# Patient Record
Sex: FEMALE | Race: WHITE | Employment: OTHER | URBAN - METROPOLITAN AREA
[De-identification: names, ages, dates, MRNs, and addresses within clinical notes are randomized per-mention and may not be internally consistent; named-entity substitution may affect disease eponyms.]

---

## 2017-07-24 RX ORDER — ESTERIFIED ESTROGEN AND METHYLTESTOSTERONE .625; 1.25 MG/1; MG/1
1 TABLET ORAL DAILY
Qty: 30 TABLET | Refills: 5 | Status: SHIPPED | OUTPATIENT
Start: 2017-07-24 | End: 2017-08-29

## 2017-08-07 ENCOUNTER — HOSPITAL ENCOUNTER (OUTPATIENT)
Dept: WOMENS IMAGING | Age: 62
Discharge: HOME | End: 2017-08-07
Attending: INTERNAL MEDICINE | Admitting: INTERNAL MEDICINE

## 2017-08-07 DIAGNOSIS — Z12.31 VISIT FOR SCREENING MAMMOGRAM: ICD-10-CM

## 2017-08-29 ENCOUNTER — OFFICE VISIT (OUTPATIENT)
Dept: GYNECOLOGY | Age: 62
End: 2017-08-29

## 2017-08-29 VITALS
DIASTOLIC BLOOD PRESSURE: 69 MMHG | RESPIRATION RATE: 17 BRPM | SYSTOLIC BLOOD PRESSURE: 128 MMHG | BODY MASS INDEX: 29.45 KG/M2 | WEIGHT: 172.5 LBS | TEMPERATURE: 97.8 F | HEIGHT: 64 IN | HEART RATE: 82 BPM

## 2017-08-29 DIAGNOSIS — Z78.0 MENOPAUSE: ICD-10-CM

## 2017-08-29 DIAGNOSIS — Z01.419 WELL WOMAN EXAM WITH ROUTINE GYNECOLOGICAL EXAM: Primary | ICD-10-CM

## 2017-08-29 PROCEDURE — 99396 PREV VISIT EST AGE 40-64: CPT | Performed by: OBSTETRICS & GYNECOLOGY

## 2017-08-29 RX ORDER — ESTERIFIED ESTROGEN AND METHYLTESTOSTERONE .625; 1.25 MG/1; MG/1
1 TABLET ORAL DAILY
Qty: 30 TABLET | Refills: 5 | Status: ON HOLD | OUTPATIENT
Start: 2017-08-29 | End: 2019-03-11 | Stop reason: ALTCHOICE

## 2017-08-29 RX ORDER — ESTRADIOL 1 MG/1
1 TABLET ORAL DAILY
Qty: 90 TABLET | Refills: 3 | Status: SHIPPED | OUTPATIENT
Start: 2017-08-29 | End: 2018-08-30 | Stop reason: SDUPTHER

## 2017-08-29 ASSESSMENT — ENCOUNTER SYMPTOMS
RESPIRATORY NEGATIVE: 1
GASTROINTESTINAL NEGATIVE: 1
EYES NEGATIVE: 1

## 2017-08-31 LAB
HPV COMMENT: NORMAL
HPV TYPE 16: NOT DETECTED
HPV TYPE 18: NOT DETECTED
HPVOH (OTHER TYPES): NOT DETECTED

## 2018-08-27 ENCOUNTER — HOSPITAL ENCOUNTER (OUTPATIENT)
Dept: WOMENS IMAGING | Age: 63
Discharge: HOME | End: 2018-08-27
Attending: OBSTETRICS & GYNECOLOGY | Admitting: OBSTETRICS & GYNECOLOGY

## 2018-08-27 DIAGNOSIS — Z12.31 VISIT FOR SCREENING MAMMOGRAM: ICD-10-CM

## 2018-08-29 ENCOUNTER — HOSPITAL ENCOUNTER (OUTPATIENT)
Dept: WOMENS IMAGING | Age: 63
Discharge: HOME | End: 2018-08-29
Admitting: OBSTETRICS & GYNECOLOGY

## 2018-08-29 DIAGNOSIS — R92.8 OTHER ABNORMAL AND INCONCLUSIVE FINDINGS ON DIAGNOSTIC IMAGING OF BREAST: ICD-10-CM

## 2018-08-29 DIAGNOSIS — R92.8 ABNORMAL MAMMOGRAM: ICD-10-CM

## 2018-08-30 DIAGNOSIS — Z78.0 MENOPAUSE: ICD-10-CM

## 2018-08-30 RX ORDER — ESTRADIOL 1 MG/1
TABLET ORAL
Qty: 30 TABLET | Refills: 2 | Status: SHIPPED | OUTPATIENT
Start: 2018-08-30 | End: 2018-11-26 | Stop reason: SDUPTHER

## 2018-10-30 ENCOUNTER — OFFICE VISIT (OUTPATIENT)
Dept: GYNECOLOGY | Age: 63
End: 2018-10-30
Payer: COMMERCIAL

## 2018-10-30 VITALS
BODY MASS INDEX: 29.53 KG/M2 | HEIGHT: 64 IN | DIASTOLIC BLOOD PRESSURE: 80 MMHG | SYSTOLIC BLOOD PRESSURE: 140 MMHG | HEART RATE: 70 BPM | RESPIRATION RATE: 17 BRPM | WEIGHT: 173 LBS

## 2018-10-30 DIAGNOSIS — Z01.419 WELL WOMAN EXAM WITH ROUTINE GYNECOLOGICAL EXAM: Primary | ICD-10-CM

## 2018-10-30 DIAGNOSIS — Z78.0 MENOPAUSE: ICD-10-CM

## 2018-10-30 DIAGNOSIS — N39.3 SUI (STRESS URINARY INCONTINENCE, FEMALE): ICD-10-CM

## 2018-10-30 PROCEDURE — 99396 PREV VISIT EST AGE 40-64: CPT | Performed by: OBSTETRICS & GYNECOLOGY

## 2018-11-04 ASSESSMENT — ENCOUNTER SYMPTOMS
GASTROINTESTINAL NEGATIVE: 1
EYES NEGATIVE: 1
RESPIRATORY NEGATIVE: 1

## 2018-11-04 NOTE — PROGRESS NOTES
fullness. Left adnexum displays no mass, no tenderness and no fullness. No erythema, tenderness or bleeding in the vagina. No foreign body in the vagina. No signs of injury around the vagina. No vaginal discharge found. Genitourinary Comments: Normal urethral meatus, nl urethra, nl bladder. Musculoskeletal: Normal range of motion. She exhibits no edema or tenderness. Lymphadenopathy:     She has no cervical adenopathy. Right: No inguinal adenopathy present. Left: No inguinal adenopathy present. Neurological: She is alert and oriented to person, place, and time. She has normal reflexes. Skin: Skin is warm and dry. She is not diaphoretic. Psychiatric: She has a normal mood and affect. Her behavior is normal. Thought content normal.       Assessment:      1. Annual  2. Menopause  3. ADEOLA      Plan:      1. Pap, calcium, exercise, mammogram, hemocult negative  2. Stable  3. Referral to urology to discuss possible treatment.          Luba Cedeño MD no

## 2018-11-26 DIAGNOSIS — Z78.0 MENOPAUSE: ICD-10-CM

## 2018-11-26 RX ORDER — ESTRADIOL 1 MG/1
TABLET ORAL
Qty: 30 TABLET | Refills: 1 | Status: SHIPPED | OUTPATIENT
Start: 2018-11-26 | End: 2019-01-22 | Stop reason: SDUPTHER

## 2019-02-05 ENCOUNTER — OFFICE VISIT (OUTPATIENT)
Dept: ORTHOPEDIC SURGERY | Age: 64
End: 2019-02-05
Payer: COMMERCIAL

## 2019-02-05 VITALS
SYSTOLIC BLOOD PRESSURE: 141 MMHG | BODY MASS INDEX: 29.55 KG/M2 | HEIGHT: 64 IN | WEIGHT: 173.06 LBS | HEART RATE: 73 BPM | DIASTOLIC BLOOD PRESSURE: 90 MMHG

## 2019-02-05 DIAGNOSIS — M54.6 THORACIC SPINE PAIN: ICD-10-CM

## 2019-02-05 DIAGNOSIS — M54.16 RIGHT LUMBAR RADICULITIS: ICD-10-CM

## 2019-02-05 DIAGNOSIS — M54.50 LUMBAR SPINE PAIN: Primary | ICD-10-CM

## 2019-02-05 DIAGNOSIS — M51.36 DDD (DEGENERATIVE DISC DISEASE), LUMBAR: ICD-10-CM

## 2019-02-05 PROBLEM — M51.369 DDD (DEGENERATIVE DISC DISEASE), LUMBAR: Status: ACTIVE | Noted: 2019-02-05

## 2019-02-05 PROCEDURE — 99203 OFFICE O/P NEW LOW 30 MIN: CPT | Performed by: FAMILY MEDICINE

## 2019-02-05 PROCEDURE — L0625 LO FLEX L1-BELOW L5 PRE OTS: HCPCS | Performed by: FAMILY MEDICINE

## 2019-02-05 RX ORDER — NABUMETONE 750 MG/1
750 TABLET, FILM COATED ORAL 2 TIMES DAILY
Qty: 60 TABLET | Refills: 3 | Status: SHIPPED
Start: 2019-02-05 | End: 2020-10-30 | Stop reason: SDUPTHER

## 2019-02-05 RX ORDER — CYCLOBENZAPRINE HCL 10 MG
10 TABLET ORAL 3 TIMES DAILY PRN
Qty: 30 TABLET | Refills: 0 | Status: SHIPPED | OUTPATIENT
Start: 2019-02-05 | End: 2019-02-15

## 2019-02-05 RX ORDER — METHYLPREDNISOLONE 4 MG/1
TABLET ORAL
Qty: 21 KIT | Refills: 0 | Status: ON HOLD | OUTPATIENT
Start: 2019-02-05 | End: 2019-03-11 | Stop reason: ALTCHOICE

## 2019-02-07 ENCOUNTER — TELEPHONE (OUTPATIENT)
Dept: ORTHOPEDIC SURGERY | Age: 64
End: 2019-02-07

## 2019-02-07 ENCOUNTER — HOSPITAL ENCOUNTER (OUTPATIENT)
Dept: PHYSICAL THERAPY | Age: 64
Setting detail: THERAPIES SERIES
Discharge: HOME | End: 2019-02-07
Payer: COMMERCIAL

## 2019-02-07 PROCEDURE — 97161 PT EVAL LOW COMPLEX 20 MIN: CPT | Performed by: PHYSICAL THERAPIST

## 2019-02-07 PROCEDURE — G8981 BODY POS CURRENT STATUS: HCPCS | Performed by: PHYSICAL THERAPIST

## 2019-02-07 PROCEDURE — 97110 THERAPEUTIC EXERCISES: CPT | Performed by: PHYSICAL THERAPIST

## 2019-02-07 PROCEDURE — 97112 NEUROMUSCULAR REEDUCATION: CPT | Performed by: PHYSICAL THERAPIST

## 2019-02-07 PROCEDURE — G8982 BODY POS GOAL STATUS: HCPCS | Performed by: PHYSICAL THERAPIST

## 2019-02-13 ENCOUNTER — HOSPITAL ENCOUNTER (OUTPATIENT)
Dept: PHYSICAL THERAPY | Age: 64
Setting detail: THERAPIES SERIES
Discharge: HOME | End: 2019-02-13
Payer: COMMERCIAL

## 2019-02-13 PROCEDURE — 97110 THERAPEUTIC EXERCISES: CPT | Performed by: PHYSICAL THERAPIST

## 2019-02-13 PROCEDURE — 97140 MANUAL THERAPY 1/> REGIONS: CPT | Performed by: PHYSICAL THERAPIST

## 2019-02-14 ENCOUNTER — OFFICE VISIT (OUTPATIENT)
Dept: ORTHOPEDIC SURGERY | Age: 64
End: 2019-02-14
Payer: COMMERCIAL

## 2019-02-14 VITALS
WEIGHT: 173 LBS | DIASTOLIC BLOOD PRESSURE: 76 MMHG | BODY MASS INDEX: 29.53 KG/M2 | SYSTOLIC BLOOD PRESSURE: 131 MMHG | HEIGHT: 64 IN | HEART RATE: 78 BPM

## 2019-02-14 DIAGNOSIS — M71.38 SYNOVIAL CYST OF LUMBAR SPINE: Primary | ICD-10-CM

## 2019-02-14 DIAGNOSIS — M47.816 FACET SYNDROME, LUMBAR: ICD-10-CM

## 2019-02-14 DIAGNOSIS — M51.26 PROTRUSION OF LUMBAR INTERVERTEBRAL DISC: ICD-10-CM

## 2019-02-14 DIAGNOSIS — M54.16 LUMBAR RADICULITIS: ICD-10-CM

## 2019-02-14 PROCEDURE — 99213 OFFICE O/P EST LOW 20 MIN: CPT | Performed by: FAMILY MEDICINE

## 2019-02-18 ENCOUNTER — TELEPHONE (OUTPATIENT)
Dept: ORTHOPEDIC SURGERY | Age: 64
End: 2019-02-18

## 2019-02-18 ENCOUNTER — HOSPITAL ENCOUNTER (OUTPATIENT)
Dept: PHYSICAL THERAPY | Age: 64
Setting detail: THERAPIES SERIES
Discharge: HOME | End: 2019-02-18
Payer: COMMERCIAL

## 2019-02-18 PROCEDURE — 97140 MANUAL THERAPY 1/> REGIONS: CPT | Performed by: PHYSICAL THERAPIST

## 2019-02-18 PROCEDURE — 97110 THERAPEUTIC EXERCISES: CPT | Performed by: PHYSICAL THERAPIST

## 2019-02-21 ENCOUNTER — OFFICE VISIT (OUTPATIENT)
Dept: ORTHOPEDIC SURGERY | Age: 64
End: 2019-02-21
Payer: COMMERCIAL

## 2019-02-21 VITALS
HEIGHT: 64 IN | DIASTOLIC BLOOD PRESSURE: 84 MMHG | HEART RATE: 67 BPM | SYSTOLIC BLOOD PRESSURE: 121 MMHG | WEIGHT: 173.06 LBS | BODY MASS INDEX: 29.55 KG/M2

## 2019-02-21 DIAGNOSIS — M47.26 OSTEOARTHRITIS OF SPINE WITH RADICULOPATHY, LUMBAR REGION: ICD-10-CM

## 2019-02-21 DIAGNOSIS — M71.38 SYNOVIAL CYST OF LUMBAR SPINE: Primary | ICD-10-CM

## 2019-02-21 DIAGNOSIS — M48.062 LUMBAR STENOSIS WITH NEUROGENIC CLAUDICATION: ICD-10-CM

## 2019-02-21 PROCEDURE — 99244 OFF/OP CNSLTJ NEW/EST MOD 40: CPT | Performed by: PHYSICAL MEDICINE & REHABILITATION

## 2019-03-01 ENCOUNTER — TELEPHONE (OUTPATIENT)
Dept: ORTHOPEDIC SURGERY | Age: 64
End: 2019-03-01

## 2019-03-11 ENCOUNTER — HOSPITAL ENCOUNTER (OUTPATIENT)
Age: 64
Setting detail: OUTPATIENT SURGERY
Discharge: HOME | End: 2019-03-11
Attending: PHYSICAL MEDICINE & REHABILITATION | Admitting: PHYSICAL MEDICINE & REHABILITATION
Payer: COMMERCIAL

## 2019-03-11 ENCOUNTER — APPOINTMENT (OUTPATIENT)
Dept: GENERAL RADIOLOGY | Age: 64
End: 2019-03-11
Attending: PHYSICAL MEDICINE & REHABILITATION
Payer: COMMERCIAL

## 2019-03-11 VITALS
BODY MASS INDEX: 30.12 KG/M2 | HEIGHT: 63 IN | SYSTOLIC BLOOD PRESSURE: 140 MMHG | WEIGHT: 170 LBS | OXYGEN SATURATION: 100 % | HEART RATE: 56 BPM | RESPIRATION RATE: 16 BRPM | TEMPERATURE: 97 F | DIASTOLIC BLOOD PRESSURE: 86 MMHG

## 2019-03-11 PROCEDURE — 99152 MOD SED SAME PHYS/QHP 5/>YRS: CPT | Performed by: PHYSICAL MEDICINE & REHABILITATION

## 2019-03-11 PROCEDURE — 6360000002 HC RX W HCPCS: Performed by: PHYSICAL MEDICINE & REHABILITATION

## 2019-03-11 PROCEDURE — 2500000003 HC RX 250 WO HCPCS: Performed by: PHYSICAL MEDICINE & REHABILITATION

## 2019-03-11 PROCEDURE — 3610000056 HC PAIN LEVEL 4 BASE (NON-OR): Performed by: PHYSICAL MEDICINE & REHABILITATION

## 2019-03-11 PROCEDURE — 3209999900 FLUORO FOR SURGICAL PROCEDURES

## 2019-03-11 PROCEDURE — 2709999900 HC NON-CHARGEABLE SUPPLY: Performed by: PHYSICAL MEDICINE & REHABILITATION

## 2019-03-11 RX ORDER — BUPIVACAINE HYDROCHLORIDE 2.5 MG/ML
INJECTION, SOLUTION INFILTRATION; PERINEURAL
Status: COMPLETED | OUTPATIENT
Start: 2019-03-11 | End: 2019-03-11

## 2019-03-11 RX ORDER — LIDOCAINE HYDROCHLORIDE 10 MG/ML
INJECTION, SOLUTION EPIDURAL; INFILTRATION; INTRACAUDAL; PERINEURAL
Status: COMPLETED | OUTPATIENT
Start: 2019-03-11 | End: 2019-03-11

## 2019-03-11 RX ORDER — METHYLPREDNISOLONE ACETATE 80 MG/ML
INJECTION, SUSPENSION INTRA-ARTICULAR; INTRALESIONAL; INTRAMUSCULAR; SOFT TISSUE
Status: COMPLETED | OUTPATIENT
Start: 2019-03-11 | End: 2019-03-11

## 2019-03-11 ASSESSMENT — PAIN - FUNCTIONAL ASSESSMENT: PAIN_FUNCTIONAL_ASSESSMENT: 0-10

## 2019-03-11 ASSESSMENT — PAIN DESCRIPTION - DESCRIPTORS: DESCRIPTORS: TINGLING

## 2019-03-11 ASSESSMENT — PAIN SCALES - GENERAL: PAINLEVEL_OUTOF10: 3

## 2019-03-28 ENCOUNTER — OFFICE VISIT (OUTPATIENT)
Dept: ORTHOPEDIC SURGERY | Age: 64
End: 2019-03-28
Payer: COMMERCIAL

## 2019-03-28 VITALS
WEIGHT: 169.97 LBS | HEIGHT: 63 IN | DIASTOLIC BLOOD PRESSURE: 70 MMHG | SYSTOLIC BLOOD PRESSURE: 122 MMHG | HEART RATE: 86 BPM | BODY MASS INDEX: 30.12 KG/M2

## 2019-03-28 DIAGNOSIS — M47.816 SPONDYLOSIS WITHOUT MYELOPATHY OR RADICULOPATHY, LUMBAR REGION: ICD-10-CM

## 2019-03-28 DIAGNOSIS — M71.38 SYNOVIAL CYST OF LUMBAR SPINE: Primary | ICD-10-CM

## 2019-03-28 PROCEDURE — 99213 OFFICE O/P EST LOW 20 MIN: CPT | Performed by: PHYSICAL MEDICINE & REHABILITATION

## 2019-08-06 ENCOUNTER — TELEPHONE (OUTPATIENT)
Dept: GYNECOLOGY | Age: 64
End: 2019-08-06

## 2019-08-07 ENCOUNTER — TELEPHONE (OUTPATIENT)
Dept: GYNECOLOGY | Age: 64
End: 2019-08-07

## 2019-08-07 NOTE — TELEPHONE ENCOUNTER
Called patients insurance [employer] at [phone]  to get PA. Spoke to Nancy Benitez 95 she stated  Patients PA has been approved for 12/30/18- 12/31/2019. Rep stated she will be sending us a fax with PA.

## 2019-09-19 ENCOUNTER — HOSPITAL ENCOUNTER (OUTPATIENT)
Dept: WOMENS IMAGING | Age: 64
Discharge: HOME | End: 2019-09-19
Payer: MEDICARE

## 2019-09-19 DIAGNOSIS — Z12.39 BREAST CANCER SCREENING: ICD-10-CM

## 2019-09-19 PROCEDURE — 77067 SCR MAMMO BI INCL CAD: CPT

## 2019-11-18 ENCOUNTER — OFFICE VISIT (OUTPATIENT)
Dept: GYNECOLOGY | Age: 64
End: 2019-11-18
Payer: MEDICARE

## 2019-11-18 VITALS
BODY MASS INDEX: 28.73 KG/M2 | HEART RATE: 72 BPM | WEIGHT: 168.25 LBS | RESPIRATION RATE: 14 BRPM | SYSTOLIC BLOOD PRESSURE: 141 MMHG | HEIGHT: 64 IN | DIASTOLIC BLOOD PRESSURE: 80 MMHG

## 2019-11-18 DIAGNOSIS — Z01.419 WELL WOMAN EXAM WITH ROUTINE GYNECOLOGICAL EXAM: Primary | ICD-10-CM

## 2019-11-18 DIAGNOSIS — Z78.0 MENOPAUSE: ICD-10-CM

## 2019-11-18 PROCEDURE — 99397 PER PM REEVAL EST PAT 65+ YR: CPT | Performed by: OBSTETRICS & GYNECOLOGY

## 2019-11-18 RX ORDER — ESTERIFIED ESTROGEN AND METHYLTESTOSTERONE .625; 1.25 MG/1; MG/1
TABLET ORAL
COMMUNITY
End: 2019-11-18

## 2019-11-18 RX ORDER — ANTIARTHRITIC COMBINATION NO.2 900 MG
TABLET ORAL
COMMUNITY
End: 2022-03-03 | Stop reason: ALTCHOICE

## 2019-11-18 RX ORDER — ESTRADIOL 1 MG/1
1 TABLET ORAL DAILY
Qty: 90 TABLET | Refills: 3 | Status: SHIPPED | OUTPATIENT
Start: 2019-11-18 | End: 2020-12-01

## 2019-11-18 ASSESSMENT — ENCOUNTER SYMPTOMS
GASTROINTESTINAL NEGATIVE: 1
RESPIRATORY NEGATIVE: 1
EYES NEGATIVE: 1

## 2019-11-21 ENCOUNTER — OFFICE VISIT (OUTPATIENT)
Dept: ORTHOPEDIC SURGERY | Age: 64
End: 2019-11-21
Payer: MEDICARE

## 2019-11-21 VITALS
WEIGHT: 168 LBS | BODY MASS INDEX: 29.77 KG/M2 | SYSTOLIC BLOOD PRESSURE: 150 MMHG | HEART RATE: 79 BPM | HEIGHT: 63 IN | DIASTOLIC BLOOD PRESSURE: 92 MMHG

## 2019-11-21 DIAGNOSIS — M47.816 FACET SYNDROME, LUMBAR: ICD-10-CM

## 2019-11-21 DIAGNOSIS — M70.61 GREATER TROCHANTERIC BURSITIS OF RIGHT HIP: Primary | ICD-10-CM

## 2019-11-21 DIAGNOSIS — M51.26 PROTRUSION OF LUMBAR INTERVERTEBRAL DISC: ICD-10-CM

## 2019-11-21 DIAGNOSIS — M71.38 SYNOVIAL CYST OF LUMBAR SPINE: ICD-10-CM

## 2019-11-21 DIAGNOSIS — M54.16 LUMBAR RADICULITIS: ICD-10-CM

## 2019-11-21 PROCEDURE — 99214 OFFICE O/P EST MOD 30 MIN: CPT | Performed by: FAMILY MEDICINE

## 2019-11-21 PROCEDURE — 20610 DRAIN/INJ JOINT/BURSA W/O US: CPT | Performed by: FAMILY MEDICINE

## 2019-11-21 RX ORDER — BETAMETHASONE SODIUM PHOSPHATE AND BETAMETHASONE ACETATE 3; 3 MG/ML; MG/ML
6 INJECTION, SUSPENSION INTRA-ARTICULAR; INTRALESIONAL; INTRAMUSCULAR; SOFT TISSUE ONCE
Status: COMPLETED | OUTPATIENT
Start: 2019-11-21 | End: 2019-11-21

## 2019-11-21 RX ORDER — NABUMETONE 750 MG/1
750 TABLET, FILM COATED ORAL 2 TIMES DAILY
Qty: 60 TABLET | Refills: 3 | Status: ON HOLD | OUTPATIENT
Start: 2019-11-21 | End: 2020-12-28

## 2019-11-21 RX ORDER — METHYLPREDNISOLONE 4 MG/1
TABLET ORAL
Qty: 21 KIT | Refills: 0 | Status: ON HOLD | OUTPATIENT
Start: 2019-11-21 | End: 2019-12-16 | Stop reason: ALTCHOICE

## 2019-11-21 RX ADMIN — BETAMETHASONE SODIUM PHOSPHATE AND BETAMETHASONE ACETATE 6 MG: 3; 3 INJECTION, SUSPENSION INTRA-ARTICULAR; INTRALESIONAL; INTRAMUSCULAR; SOFT TISSUE at 11:34

## 2019-11-27 ENCOUNTER — OFFICE VISIT (OUTPATIENT)
Dept: ORTHOPEDIC SURGERY | Age: 64
End: 2019-11-27
Payer: MEDICARE

## 2019-11-27 VITALS
BODY MASS INDEX: 29.77 KG/M2 | DIASTOLIC BLOOD PRESSURE: 70 MMHG | WEIGHT: 167.99 LBS | HEIGHT: 63 IN | SYSTOLIC BLOOD PRESSURE: 122 MMHG

## 2019-11-27 DIAGNOSIS — M70.61 GREATER TROCHANTERIC BURSITIS OF RIGHT HIP: ICD-10-CM

## 2019-11-27 DIAGNOSIS — M47.816 SPONDYLOSIS WITHOUT MYELOPATHY OR RADICULOPATHY, LUMBAR REGION: ICD-10-CM

## 2019-11-27 DIAGNOSIS — M71.38 SYNOVIAL CYST OF LUMBAR SPINE: Primary | ICD-10-CM

## 2019-11-27 DIAGNOSIS — M48.062 LUMBAR STENOSIS WITH NEUROGENIC CLAUDICATION: ICD-10-CM

## 2019-11-27 PROCEDURE — 99214 OFFICE O/P EST MOD 30 MIN: CPT | Performed by: PHYSICAL MEDICINE & REHABILITATION

## 2019-12-04 ENCOUNTER — HOSPITAL ENCOUNTER (OUTPATIENT)
Dept: PHYSICAL THERAPY | Age: 64
Setting detail: THERAPIES SERIES
Discharge: HOME | End: 2019-12-04
Payer: MEDICARE

## 2019-12-04 PROCEDURE — 97110 THERAPEUTIC EXERCISES: CPT | Performed by: PHYSICAL THERAPIST

## 2019-12-04 PROCEDURE — 97161 PT EVAL LOW COMPLEX 20 MIN: CPT | Performed by: PHYSICAL THERAPIST

## 2019-12-04 PROCEDURE — G0283 ELEC STIM OTHER THAN WOUND: HCPCS | Performed by: PHYSICAL THERAPIST

## 2019-12-04 PROCEDURE — 97140 MANUAL THERAPY 1/> REGIONS: CPT | Performed by: PHYSICAL THERAPIST

## 2019-12-04 PROCEDURE — 97112 NEUROMUSCULAR REEDUCATION: CPT | Performed by: PHYSICAL THERAPIST

## 2019-12-06 ENCOUNTER — HOSPITAL ENCOUNTER (OUTPATIENT)
Dept: PHYSICAL THERAPY | Age: 64
Setting detail: THERAPIES SERIES
Discharge: HOME | End: 2019-12-06
Payer: MEDICARE

## 2019-12-06 PROCEDURE — 97110 THERAPEUTIC EXERCISES: CPT | Performed by: PHYSICAL THERAPIST

## 2019-12-06 PROCEDURE — 97112 NEUROMUSCULAR REEDUCATION: CPT | Performed by: PHYSICAL THERAPIST

## 2019-12-06 PROCEDURE — 97140 MANUAL THERAPY 1/> REGIONS: CPT | Performed by: PHYSICAL THERAPIST

## 2019-12-10 ENCOUNTER — HOSPITAL ENCOUNTER (OUTPATIENT)
Dept: PHYSICAL THERAPY | Age: 64
Setting detail: THERAPIES SERIES
Discharge: HOME | End: 2019-12-10
Payer: MEDICARE

## 2019-12-10 PROCEDURE — 97112 NEUROMUSCULAR REEDUCATION: CPT | Performed by: PHYSICAL THERAPIST

## 2019-12-10 PROCEDURE — 97110 THERAPEUTIC EXERCISES: CPT | Performed by: PHYSICAL THERAPIST

## 2019-12-10 PROCEDURE — 97140 MANUAL THERAPY 1/> REGIONS: CPT | Performed by: PHYSICAL THERAPIST

## 2019-12-11 ENCOUNTER — TELEPHONE (OUTPATIENT)
Dept: ORTHOPEDIC SURGERY | Age: 64
End: 2019-12-11

## 2019-12-12 ENCOUNTER — HOSPITAL ENCOUNTER (OUTPATIENT)
Dept: PHYSICAL THERAPY | Age: 64
Setting detail: THERAPIES SERIES
Discharge: HOME | End: 2019-12-12
Payer: MEDICARE

## 2019-12-16 ENCOUNTER — APPOINTMENT (OUTPATIENT)
Dept: GENERAL RADIOLOGY | Age: 64
End: 2019-12-16
Attending: PHYSICAL MEDICINE & REHABILITATION
Payer: MEDICARE

## 2019-12-16 ENCOUNTER — HOSPITAL ENCOUNTER (OUTPATIENT)
Age: 64
Setting detail: OUTPATIENT SURGERY
Discharge: HOME | End: 2019-12-16
Attending: PHYSICAL MEDICINE & REHABILITATION | Admitting: PHYSICAL MEDICINE & REHABILITATION
Payer: MEDICARE

## 2019-12-16 VITALS
DIASTOLIC BLOOD PRESSURE: 88 MMHG | RESPIRATION RATE: 16 BRPM | TEMPERATURE: 97.5 F | SYSTOLIC BLOOD PRESSURE: 120 MMHG | OXYGEN SATURATION: 99 % | HEART RATE: 79 BPM

## 2019-12-16 PROCEDURE — 3610000056 HC PAIN LEVEL 4 BASE (NON-OR): Performed by: PHYSICAL MEDICINE & REHABILITATION

## 2019-12-16 PROCEDURE — 99152 MOD SED SAME PHYS/QHP 5/>YRS: CPT | Performed by: PHYSICAL MEDICINE & REHABILITATION

## 2019-12-16 PROCEDURE — 2500000003 HC RX 250 WO HCPCS: Performed by: PHYSICAL MEDICINE & REHABILITATION

## 2019-12-16 PROCEDURE — 3209999900 FLUORO FOR SURGICAL PROCEDURES

## 2019-12-16 PROCEDURE — 6360000002 HC RX W HCPCS: Performed by: PHYSICAL MEDICINE & REHABILITATION

## 2019-12-16 PROCEDURE — 2709999900 HC NON-CHARGEABLE SUPPLY: Performed by: PHYSICAL MEDICINE & REHABILITATION

## 2019-12-16 RX ORDER — BUPIVACAINE HYDROCHLORIDE 2.5 MG/ML
INJECTION, SOLUTION INFILTRATION; PERINEURAL
Status: COMPLETED | OUTPATIENT
Start: 2019-12-16 | End: 2019-12-16

## 2019-12-16 RX ORDER — MIDAZOLAM HYDROCHLORIDE 1 MG/ML
INJECTION INTRAMUSCULAR; INTRAVENOUS
Status: COMPLETED | OUTPATIENT
Start: 2019-12-16 | End: 2019-12-16

## 2019-12-16 RX ORDER — LIDOCAINE HYDROCHLORIDE 10 MG/ML
INJECTION, SOLUTION EPIDURAL; INFILTRATION; INTRACAUDAL; PERINEURAL
Status: COMPLETED | OUTPATIENT
Start: 2019-12-16 | End: 2019-12-16

## 2019-12-16 RX ORDER — BETAMETHASONE SODIUM PHOSPHATE AND BETAMETHASONE ACETATE 3; 3 MG/ML; MG/ML
INJECTION, SUSPENSION INTRA-ARTICULAR; INTRALESIONAL; INTRAMUSCULAR; SOFT TISSUE
Status: COMPLETED | OUTPATIENT
Start: 2019-12-16 | End: 2019-12-16

## 2019-12-16 ASSESSMENT — PAIN SCALES - GENERAL: PAINLEVEL_OUTOF10: 0

## 2019-12-16 ASSESSMENT — PAIN - FUNCTIONAL ASSESSMENT: PAIN_FUNCTIONAL_ASSESSMENT: 0-10

## 2019-12-16 ASSESSMENT — PAIN DESCRIPTION - DESCRIPTORS: DESCRIPTORS: ACHING

## 2020-01-08 ENCOUNTER — OFFICE VISIT (OUTPATIENT)
Dept: ORTHOPEDIC SURGERY | Age: 65
End: 2020-01-08
Payer: MEDICARE

## 2020-01-08 VITALS
WEIGHT: 167.99 LBS | HEART RATE: 80 BPM | HEIGHT: 63 IN | BODY MASS INDEX: 29.77 KG/M2 | DIASTOLIC BLOOD PRESSURE: 84 MMHG | SYSTOLIC BLOOD PRESSURE: 126 MMHG

## 2020-01-08 PROCEDURE — 99214 OFFICE O/P EST MOD 30 MIN: CPT | Performed by: PHYSICAL MEDICINE & REHABILITATION

## 2020-01-08 NOTE — PROGRESS NOTES
Follow up: [address]  1955  [id number]         Chief Complaint   Patient presents with    Lower Back Pain     12/16 R L4-5 FACET W/ L4-5 FACET CYST ASPIRATION         HISTORY OF PRESENT ILLNESS:  Ms. Neville Fields is a 71 y.o. female returns for a follow up visit for multiple medical problems. Her current presenting problems are   1. Synovial cyst of lumbar spine    2. Greater trochanteric bursitis of right hip    3. Spondylosis without myelopathy or radiculopathy, lumbar region    4. Bilateral sacroiliitis ([address]    5. Sacrodynia    . As per information/history obtained from the PADT(patient assessment and documentation tool) - She complains of pain in the hips Bilateral with radiation to the buttocks She rates the pain 1/10 in the back but she has new tailbone pain and describes it as piercing, burning. Pain is made worse by: sitting, strenuous activity. She denies side effects from the current pain regimen. Patient reports that since the last follow up visit the physical functioning is better, family/social relationships are better, mood is better and sleep patterns are better, and that the overall functioning is better. Patient denies neurological bowel or bladder. Patient underwent a right L4-5 facet with L4-5 facet cyst aspiration on 12/16/2019. She describes experiencing nearly 90% improvement of her procedure. She describes her low back pain as a 1/10, nearly nonexistent. Patient notes bilateral hip and tailbone pain that is her main concern. Strenuous activity, lying on her sides and sitting does increase this pain. She does alter her sitting position to accommodate the pain. This pain is currently at a 2-10/10 in the tailbone and a 0/10 in the hip. She was able to exercise twice last week, however this increased her pain - in her hips and tailbone. She has severe tailbone pain which limits her ability to sit. + injury when she was a child due to a fall.  Sitting makes her pain worse and this gets better with rest. No recent imaging. Pain is 6/10 and sharp. Associated signs and symptoms:   Neurogenic bowel or bladder symptoms:  no   Perceived weakness:  yes   Difficulty walking:  yes              Past Medical History:   Past Medical History:   Diagnosis Date    DDD (degenerative disc disease), lumbar 2/5/2019    Hemorrhoids, external     HIGH CHOLESTEROL     IBS (irritable bowel syndrome)     Right lumbar radiculitis 2/5/2019    Thoracic spine pain 2/5/2019      Past Surgical History:     Past Surgical History:   Procedure Laterality Date    CERVICAL FUSION  2009    DILATION AND CURETTAGE OF UTERUS      HYSTERECTOMY      prolapsed uterus, still with ovaries    NERVE BLOCK LUMB FACET LEVEL 1 BILATERAL Right 3/11/2019    RIGHT L4/L5 FACET CYST ASPIRATE WITH FLUOROSCOPY (10931) performed by Pamela Segura MD at [address] Right 12/16/2019    RIGHT L4/5 LUMBAR FACET CYST ASPIRATION WITH FLUOROSCOPY performed by Pamela Segura MD at [address]  2006     Current Medications:     Current Outpatient Medications:     nabumetone (RELAFEN) 750 MG tablet, Take 1 tablet by mouth 2 times daily, Disp: 60 tablet, Rfl: 3    Biotin 5000 MCG TABS, Take by mouth, Disp: , Rfl:     bromfenac (PROLENSA) 0.07 % SOLN, Apply 1 drop to eye, Disp: , Rfl:     estradiol (ESTRACE) 1 MG tablet, Take 1 tablet by mouth daily, Disp: 90 tablet, Rfl: 3    nabumetone (RELAFEN) 750 MG tablet, Take 1 tablet by mouth 2 times daily, Disp: 60 tablet, Rfl: 3    omeprazole (PRILOSEC) 40 MG capsule, Take 40 mg by mouth daily , Disp: , Rfl:     aspirin 81 MG chewable tablet, Take 81 mg by mouth daily. , Disp: , Rfl:   Allergies:  Latex; Codeine; Tetanus toxoids; and Pravastatin  Social History:    reports that she has never smoked. She has never used smokeless tobacco. She reports current alcohol use. She reports that she does not use drugs.   Family History:   Family History EXTREMITY: Inspection/examination of the left upper extremity does not show any tenderness, deformity or injury. Range of motion is unremarkable and pain-free. There is no gross instability. There are no rashes, ulcerations or lesions. Strength and tone are normal. No atrophy or abnormal movements are noted. LUMBAR/SACRAL EXAMINATION:  · Inspection: Local inspection shows no step-off or bruising. Lumbar alignment is normal. No instability is noted. · Palpation:   No evidence of tenderness at the midline. Lumbar paraspinal tenderness: No tenderness to palpation of the lumbar paraspinals  Bursal tenderness Bilateral GT bursa tenderness  There is no paraspinal spasm. · Range of Motion: pain-free ROM  · Strength:   Strength testing is 5/5 in all muscle groups tested. · Special Tests:   Straight leg raise and crossed SLR negative. · Skin: There are no rashes, ulcerations or lesions. · Reflexes: Reflexes are symmetrically 2+ at the patellar and ankle tendons. Clonus absent bilaterally at the feet. · Gait & station: normal, patient ambulates without assistance and no ataxia  · Additional Examinations:  · RIGHT LOWER EXTREMITY: Inspection/examination of the right lower extremity does not show any tenderness, deformity or injury. Range of motion is normal and pain-free. There is no gross instability. There are no rashes, ulcerations or lesions. Strength and tone are normal. No atrophy or abnormal movements are noted. · LEFT LOWER EXTREMITY:  Inspection/examination of the left lower extremity does not show any tenderness, deformity or injury. Range of motion is normal and pain-free. There is no gross instability. There are no rashes, ulcerations or lesions. Strength and tone are normal. No atrophy or abnormal movements are noted. Diagnostic Testing:    MR Lumbar spine shows 2/12/19  1.  A concentric L4-5 disc displacement mildly eccentric to the left, prominent epidural    lipomatosis, marked medialized

## 2020-01-09 ENCOUNTER — TELEPHONE (OUTPATIENT)
Dept: ORTHOPEDIC SURGERY | Age: 65
End: 2020-01-09

## 2020-01-09 NOTE — TELEPHONE ENCOUNTER
L/M FOR PATIENT regarding MRI PELVIS approval and authorization being valid until 7/7/2020. Patient was instructed to call [address] [place] [address] to schedule MRI PELVIS, then contact our office for follow up appointment. MRI PELVIS results will not be given over the phone. Patient was also asked to allow a minimum 48 hours for follow up appointment from MRI scan in order for staff to obtain MRI report. Patient currently has a follow up appointment schedule for 2/26 @ [facility]. Advised to contact the office if needing to reschedule this to accommodate MRI scan.

## 2020-02-26 ENCOUNTER — OFFICE VISIT (OUTPATIENT)
Dept: ORTHOPEDIC SURGERY | Age: 65
End: 2020-02-26
Payer: MEDICARE

## 2020-02-26 VITALS
HEART RATE: 78 BPM | SYSTOLIC BLOOD PRESSURE: 152 MMHG | DIASTOLIC BLOOD PRESSURE: 96 MMHG | WEIGHT: 167.99 LBS | HEIGHT: 63 IN | BODY MASS INDEX: 29.77 KG/M2

## 2020-02-26 PROCEDURE — 99214 OFFICE O/P EST MOD 30 MIN: CPT | Performed by: PHYSICAL MEDICINE & REHABILITATION

## 2020-02-26 NOTE — PROGRESS NOTES
SIC    SINUS SURGERY  2006     Current Medications:     Current Outpatient Medications:     nabumetone (RELAFEN) 750 MG tablet, Take 1 tablet by mouth 2 times daily, Disp: 60 tablet, Rfl: 3    Biotin 5000 MCG TABS, Take by mouth, Disp: , Rfl:     bromfenac (PROLENSA) 0.07 % SOLN, Apply 1 drop to eye, Disp: , Rfl:     estradiol (ESTRACE) 1 MG tablet, Take 1 tablet by mouth daily, Disp: 90 tablet, Rfl: 3    nabumetone (RELAFEN) 750 MG tablet, Take 1 tablet by mouth 2 times daily, Disp: 60 tablet, Rfl: 3    omeprazole (PRILOSEC) 40 MG capsule, Take 40 mg by mouth daily , Disp: , Rfl:     aspirin 81 MG chewable tablet, Take 81 mg by mouth daily. , Disp: , Rfl:   Allergies:  Latex; Codeine; Tetanus toxoids; and Pravastatin  Social History:    reports that she has never smoked. She has never used smokeless tobacco. She reports current alcohol use. She reports that she does not use drugs. Family History:   Family History   Problem Relation Age of Onset    Cancer Mother     Diabetes Maternal Grandmother     Cancer Maternal Grandmother     Diabetes Paternal Grandmother     Rheum Arthritis Neg Hx     Osteoarthritis Neg Hx     Asthma Neg Hx     Breast Cancer Neg Hx     Heart Failure Neg Hx     High Cholesterol Neg Hx     Hypertension Neg Hx     Migraines Neg Hx     Ovarian Cancer Neg Hx     Rashes/Skin Problems Neg Hx     Seizures Neg Hx     Stroke Neg Hx     Thyroid Disease Neg Hx        REVIEW OF SYSTEMS:   CONSTITUTIONAL: Denies unexplained weight loss, fevers, chills or fatigue  NEUROLOGICAL: Denies unsteady gait or progressive weakness  MUSCULOSKELETAL: Denies joint swelling or redness  GI: Denies nausea, vomiting, diarrhea   : Denies bowel or bladder issues       PHYSICAL EXAM:    Vitals: Blood pressure (!) 152/96, pulse 78, height 5' 2.99\" (1.6 m), weight 167 lb 15.9 oz (76.2 kg), not currently breastfeeding.     GENERAL EXAM:  · General Apparence: Patient is adequately groomed with no Clonus absent bilaterally at the feet. · Gait & station: normal, patient ambulates without assistance and no ataxia  · Additional Examinations:  · RIGHT LOWER EXTREMITY: Inspection/examination of the right lower extremity does not show any tenderness, deformity or injury. Range of motion is normal and pain-free. There is no gross instability. There are no rashes, ulcerations or lesions. Strength and tone are normal. No atrophy or abnormal movements are noted. · LEFT LOWER EXTREMITY:  Inspection/examination of the left lower extremity does not show any tenderness, deformity or injury. Range of motion is normal and pain-free. There is no gross instability. There are no rashes, ulcerations or lesions. Strength and tone are normal. No atrophy or abnormal movements are noted. Diagnostic Testing:    MR pelvis -    1. Bilateral mild trochanteric bursitis, more prominent of the right. 2. Bilateral mild insertional tendinopathy and peritendinitis of gluteus medius. Results for orders placed or performed in visit on 11/18/19   Human papillomavirus (HPV) DNA probe thin prep high risk   Result Value Ref Range    HPV TYPE 16 Not Detected Not Detected    HPV TYPE 18 Not Detected Not Detected    HPVOH (OTHER TYPES) Not Detected Not Detected    HPV Comment See below      Impression:       1. Sacrodynia    2. Degenerative disc disease, lumbar    3. Coccygeal arthritis    4. Greater trochanteric bursitis of both hips        Plan:  Clinical Course: Above diagnoses are worsening    I discussed the diagnosis and the treatment options with Cesia Kaufman today. In Summary:  The various treatment options were outlined and discussed with Cesia Kaufman including:  Conservative care options: physical therapy, ice, medications, bracing, and activity modification. The indications for therapeutic injections. The indications for additional imaging/laboratory studies. The indications for (possible future) interventions.

## 2020-02-26 NOTE — LETTER
Please schedule the following with:     Date:   20 @ 9:20    Account: [id number]  Patient: Ambika Kent    : 1955  Address:  [place]  [address]    Phone (H):  [phone] (home)      ----------------------------------------------------------------------------------------------  Diagnosis:     ICD-10-CM    1. Sacrodynia M53.3    2. Degenerative disc disease, lumbar M51.36    3. Coccygeal arthritis M48.9    4. Greater trochanteric bursitis of both hips M70.61     M70.62          Levels:caudal lumbar RAYO + coccygeal joint injection  CPT Codes 96213, 86656    ----------------------------------------------------------------------------------------------  Injection # 1   [facility]    Attending Physician       Doyle Millan MD.      ----------------------------------------------------------------------------------------------  1st Insurance [employer] Pre-Cert#    2nd Insurance     Pre-Cert#    Comments or Special instructions:    · Infection control  ? Tested positive for MRSA in past 12 months:  no  ? Tested positive for MSSA \"staph infection\" in past 12 months: no  ? Tested positive for VRE (Vancomycin Resistant Enterococci) in past 12 months:   no  ? Currently on any antibiotics for an infection: no  · Anticoagulants:  ? On a blood thinner:  ASA/HERBALS  ?  Any history of bleeding disorder: no   · Advanced Liver disease: no   · Advanced Renal disease: no   · Glaucoma: no   · Diabetes: no      Sedation:         yes  -----------------------------------------------------------------------------------------------  Allergies   Allergen Reactions    Latex Hives, Itching and Rash     \"around the site\"    Codeine Hives    Tetanus Toxoids Swelling    Pravastatin Itching

## 2020-02-28 NOTE — PROGRESS NOTES
PATIENT REACHED   YES____NO____    PREOP INSTUCTIONS LEFT ON  [phone]______________      DATE__3/11/20_______ TIME__0920_______ARRIVAL__0820______PLACE__masc__________  NOTHING TO EAT OR DRINK  AFTER MIDNIGHT THE EVENING PRIOR OR AS INSTRUCTED BY YOUR DR.  Catherine Miller NEED A RESPONSIBLE ADULT AGE 18 OR OLDER TO DRIVE YOU HOME  PLEASE BRING INSURANCE CARD. PICTURE ID AND COMPLETE LIST OF MEDS  WEAR LOOSE COMFORTABLE CLOTHING  FOLLOW ANY INSTRUCTIONS YOUR DRS OFFICE HAS GIVEN YOU,INCLUDING WHAT MEDICATIONS TO TAKE THE AM OF PROCEDURE AND WHEN AND IF YOU NEED TO STOP ANY BLOOD THINNERS. IF YOU HAVE QUESTIONS REGARDING THIS CALL THE OFFICE  THE GOAL BLOOD SUGAR THE AM OF PROCEDURE  OR LESS ABOVE THAT THE PROCEDURE MAY BE CANCELLED  ANY QUESTIONS CALL YOUR DOCTOR. ALSO,PLEASE READ THE INSTRUCTION PACKET FROM YOUR DR IF YOU RECEIVED ONE.   SPINE INTERVENTION NUMBER [id number]

## 2020-03-05 ENCOUNTER — TELEPHONE (OUTPATIENT)
Dept: ORTHOPEDIC SURGERY | Age: 65
End: 2020-03-05

## 2020-03-05 NOTE — TELEPHONE ENCOUNTER
DOS   03/11/2020  CPT   52615  DX   M53.3   M51.36   M48.9  OP SX AUTH  [id number]  VALID   03/11/2020 - 09/07/2020    CAUDEL RAYO & COCCGEAL JOINT INJECTION    [address] [place] [address]  INSURANCE:   Isabel Riggins      CPT  64547  26010.26  [address]     [facility]  REF # [id number]

## 2020-03-11 ENCOUNTER — HOSPITAL ENCOUNTER (OUTPATIENT)
Age: 65
Setting detail: OUTPATIENT SURGERY
Discharge: HOME | End: 2020-03-11
Attending: PHYSICAL MEDICINE & REHABILITATION | Admitting: PHYSICAL MEDICINE & REHABILITATION
Payer: MEDICARE

## 2020-03-11 ENCOUNTER — APPOINTMENT (OUTPATIENT)
Dept: GENERAL RADIOLOGY | Age: 65
End: 2020-03-11
Attending: PHYSICAL MEDICINE & REHABILITATION
Payer: MEDICARE

## 2020-03-11 VITALS
SYSTOLIC BLOOD PRESSURE: 135 MMHG | TEMPERATURE: 97.6 F | RESPIRATION RATE: 16 BRPM | HEART RATE: 73 BPM | DIASTOLIC BLOOD PRESSURE: 76 MMHG | OXYGEN SATURATION: 98 %

## 2020-03-11 PROCEDURE — 3209999900 FLUORO FOR SURGICAL PROCEDURES

## 2020-03-11 PROCEDURE — 6360000002 HC RX W HCPCS: Performed by: PHYSICAL MEDICINE & REHABILITATION

## 2020-03-11 PROCEDURE — 2500000003 HC RX 250 WO HCPCS: Performed by: PHYSICAL MEDICINE & REHABILITATION

## 2020-03-11 PROCEDURE — 3610000056 HC PAIN LEVEL 4 BASE (NON-OR): Performed by: PHYSICAL MEDICINE & REHABILITATION

## 2020-03-11 PROCEDURE — 2709999900 HC NON-CHARGEABLE SUPPLY: Performed by: PHYSICAL MEDICINE & REHABILITATION

## 2020-03-11 PROCEDURE — 2580000003 HC RX 258: Performed by: PHYSICAL MEDICINE & REHABILITATION

## 2020-03-11 PROCEDURE — 99152 MOD SED SAME PHYS/QHP 5/>YRS: CPT | Performed by: PHYSICAL MEDICINE & REHABILITATION

## 2020-03-11 RX ORDER — MIDAZOLAM HYDROCHLORIDE 1 MG/ML
INJECTION INTRAMUSCULAR; INTRAVENOUS
Status: COMPLETED | OUTPATIENT
Start: 2020-03-11 | End: 2020-03-11

## 2020-03-11 RX ORDER — CHLORTHALIDONE 25 MG/1
25 TABLET ORAL DAILY
COMMUNITY
Start: 2020-03-02

## 2020-03-11 RX ORDER — METHYLPREDNISOLONE ACETATE 80 MG/ML
INJECTION, SUSPENSION INTRA-ARTICULAR; INTRALESIONAL; INTRAMUSCULAR; SOFT TISSUE
Status: COMPLETED | OUTPATIENT
Start: 2020-03-11 | End: 2020-03-11

## 2020-03-11 RX ORDER — LIDOCAINE HYDROCHLORIDE 10 MG/ML
INJECTION, SOLUTION INFILTRATION; PERINEURAL
Status: COMPLETED | OUTPATIENT
Start: 2020-03-11 | End: 2020-03-11

## 2020-03-11 RX ORDER — SODIUM CHLORIDE 9 MG/ML
INJECTION INTRAVENOUS
Status: COMPLETED | OUTPATIENT
Start: 2020-03-11 | End: 2020-03-11

## 2020-03-11 RX ORDER — BUPIVACAINE HYDROCHLORIDE 5 MG/ML
INJECTION, SOLUTION EPIDURAL; INTRACAUDAL
Status: COMPLETED | OUTPATIENT
Start: 2020-03-11 | End: 2020-03-11

## 2020-03-11 ASSESSMENT — PAIN SCALES - GENERAL
PAINLEVEL_OUTOF10: 0
PAINLEVEL_OUTOF10: 0

## 2020-03-11 ASSESSMENT — PAIN - FUNCTIONAL ASSESSMENT: PAIN_FUNCTIONAL_ASSESSMENT: 0-10

## 2020-03-11 ASSESSMENT — PAIN DESCRIPTION - DESCRIPTORS: DESCRIPTORS: ACHING;THROBBING

## 2020-03-11 NOTE — OP NOTE
Patient:  Valarie Flores  YOB: 1955  Medical Record #:  [id number]   Place:   [address] [place] [address]  Date:  3/11/2020   Physician:  Nilsa Ramirez MD, DICK    Procedure:  Lumbar Epidural Steroid Injection - Caudal approach  + Coccygeal joint injection     Pre-Procedure Diagnosis: Lumbar radiculopathy and coxalgia    Post-Procedure Diagnosis: Same    Sedation: Local with 1% Lidocaine 3 ml and 2 mg of IV Versed    EBL: None    Complications: None    Procedure Summary:    The patient was brought to the procedure suite and placed in the prone position. The skin overlying the lumbar spine was prepped and draped in the usual sterile fashion. Using fluoroscopic guidance, the Caudal  interlaminar space was identified. Through anesthetized skin a 20 gauge Touhy needle was advanced into the epidural space using continuous loss of resistance to saline technique. Isovue M300 was instilled and an epidurogram was noted without evidence of intrathecal or vascular spread. 10 ml of a solution containing preservative free normal saline and 80 mg of Depomedrol was instilled. The needle was removed and a band-aid applied. The coccygeal joint was identified with lateral fluoroscopy. A 22 gauge spinal needle was advanced to the joint space. Isovue M 300 was instilled. A mixture of 40 mg of depomedrol mixed with 0.5% marcaine was instilled. The needle was removed and band-aids applied. The patient was transferred to the post-operative area in stable condition.

## 2020-03-11 NOTE — H&P
HISTORY AND PHYSICAL/PRE-SEDATION ASSESSMENT    Patient:  Vick Berger   :  1955  Medical Record No.:  [id number]   Date:  3/11/2020  Physician:  Aria Noguera M.D. Facility: [address] [place] [address]    HISTORY OF PRESENT ILLNESS:                 The patient is a 71 y.o. female whom presents with lower back and tailbone pain. Review of the imaging and physical exam of the patient confirmed the pre-procedure diagnosis. After a thorough discussion of risks, benefits and alternatives informed consent was obtained. Past Medical History:   Past Medical History:   Diagnosis Date    DDD (degenerative disc disease), lumbar 2019    Hemorrhoids, external     HIGH CHOLESTEROL     IBS (irritable bowel syndrome)     Right lumbar radiculitis 2019    Thoracic spine pain 2019      Past Surgical History:     Past Surgical History:   Procedure Laterality Date    CERVICAL FUSION      DILATION AND CURETTAGE OF UTERUS      HYSTERECTOMY      prolapsed uterus, still with ovaries    NERVE BLOCK LUMB FACET LEVEL 1 BILATERAL Right 3/11/2019    RIGHT L4/L5 FACET CYST ASPIRATE WITH FLUOROSCOPY () performed by Aria Noguera MD at [address] Right 2019    RIGHT L4/5 LUMBAR FACET CYST ASPIRATION WITH FLUOROSCOPY performed by [facility] Noguera MD [facility]       Current Medications:   Prior to Admission medications    Medication Sig Start Date End Date Taking?  Authorizing Provider   nabumetone (RELAFEN) 750 MG tablet Take 1 tablet by mouth 2 times daily 19   Eber Carlson MD   Biotin 5000 MCG TABS Take by mouth    Historical Provider, MD   bromfenac (PROLENSA) 0.07 % SOLN Apply 1 drop to eye    Historical Provider, MD   estradiol (ESTRACE) 1 MG tablet Take 1 tablet by mouth daily 19   Elliott Spaulding MD   nabumetone (RELAFEN) 750 MG tablet Take 1 tablet by mouth 2 times daily 19   Eber Carlson MD   omeprazole (PRILOSEC) 40 MG capsule Take 40 mg by mouth daily  6/9/14   Historical Provider, MD   aspirin 81 MG chewable tablet Take 81 mg by mouth daily. Historical Provider, MD     Allergies:  Latex; Codeine; Tetanus toxoids; and Pravastatin  Social History:    reports that she has never smoked. She has never used smokeless tobacco. She reports current alcohol use. She reports that she does not use drugs. Family History:   Family History   Problem Relation Age of Onset    Cancer Mother     Diabetes Maternal Grandmother     Cancer Maternal Grandmother     Diabetes Paternal Grandmother     Rheum Arthritis Neg Hx     Osteoarthritis Neg Hx     Asthma Neg Hx     Breast Cancer Neg Hx     Heart Failure Neg Hx     High Cholesterol Neg Hx     Hypertension Neg Hx     Migraines Neg Hx     Ovarian Cancer Neg Hx     Rashes/Skin Problems Neg Hx     Seizures Neg Hx     Stroke Neg Hx     Thyroid Disease Neg Hx        Vitals: Blood pressure (!) 141/79, pulse 74, temperature 97.6 °F (36.4 °C), resp. rate 16, SpO2 97 %, not currently breastfeeding. PHYSICAL EXAM:including affected areas  HENT: Airway patent and reviewed  Cardiovascular: Normal rate, regular rhythm, normal heart sounds. Pulmonary/Chest: No wheezes. No rhonchi. No rales. Abdominal: Soft. Bowel sounds are normal. No distension. Extremities: Moves all extremities equally  Cervical and Lumbar Spine: Painful range of motion, no midline tenderness       Diagnosis:Sacrodynia; Coccygeal arthritis   M53.3  M51.36  M48.9  M70.61  M70.62    Plan: Proceed with planned procedure      ASA CLASS:         []   I. Normal, healthy adult           [x]   II.  Mild systemic disease            []   III.   Severe systemic disease      Mallampati: Mallampati Class II - (soft palate, fauces & uvula are visible)      Sedation plan:   [x]  Local              []  Minimal                  []  General anesthesia    Patient's condition acceptable for planned procedure/sedation. Post Procedure Plan   Return to same level of care   ______________________     The risks and benefits as well as alternatives to the procedure have been discussed with the patient and or family. The patient and or next of kin understands and agrees to proceed.     Darwin Bolivar M.D.

## 2020-09-30 ENCOUNTER — HOSPITAL ENCOUNTER (OUTPATIENT)
Dept: WOMENS IMAGING | Age: 65
Discharge: HOME | End: 2020-09-30
Payer: MEDICARE

## 2020-09-30 PROCEDURE — 77063 BREAST TOMOSYNTHESIS BI: CPT

## 2020-10-08 ENCOUNTER — OFFICE VISIT (OUTPATIENT)
Dept: ORTHOPEDIC SURGERY | Age: 65
End: 2020-10-08
Payer: MEDICARE

## 2020-10-08 VITALS — WEIGHT: 167.99 LBS | RESPIRATION RATE: 12 BRPM | BODY MASS INDEX: 29.77 KG/M2 | HEIGHT: 63 IN | TEMPERATURE: 97.4 F

## 2020-10-08 PROCEDURE — 99214 OFFICE O/P EST MOD 30 MIN: CPT | Performed by: PHYSICAL MEDICINE & REHABILITATION

## 2020-10-08 RX ORDER — EZETIMIBE 10 MG/1
10 TABLET ORAL DAILY
Status: ON HOLD | COMMUNITY
Start: 2020-09-04 | End: 2020-11-04

## 2020-10-08 NOTE — LETTER
Please schedule the following with:     Date:   20 @ 8:45    Account: [id number]  Patient: Nancy Nixon    : 1955  Address:  [place]    Phone (H):  [phone] (home)      ----------------------------------------------------------------------------------------------  Diagnosis:     ICD-10-CM    1. Sacrodynia  M53.3    2. Coccygeal arthritis  M48.9    3. Lumbar radiculitis  M54.16    4. Spondylosis without myelopathy or radiculopathy, lumbar region  M47.816          Levels:caudal lumbar RAYO and coccygeal joint injection  CPT Codes 33851, 83187    ----------------------------------------------------------------------------------------------  Injection # 2   [facility]    Attending Physician       Sam Meraz.  Orquidea Oreilly MD.  ----------------------------------------------------------------------------------------------  Injection Scheduled For:    At:    1st Insurance [facility]    Pre-Cert#    2nd Insurance     Pre-Cert#    Comments or Special instructions:  COVID TEST: yes    · Infection control  · Tested positive for MRSA in past 12 months:  no  · Tested positive for MSSA \"staph infection\" in past 12 months: no  · Tested positive for VRE (Vancomycin Resistant Enterococci) in past 12 months:   no  · Currently on any antibiotics for an infection: no  · Anticoagulants:  · On a blood thinner:  no   · Any history of bleeding disorder: no   · Advanced Liver disease: no   · Advanced Renal disease: no   · Glaucoma: no   · Diabetes: no     Sedation:  Yes  -----------------------------------------------------------------------------------------------  Allergies   Allergen Reactions    Latex Hives, Itching and Rash     \"around the site\"    Codeine Hives    Tetanus Toxoids Swelling    Pravastatin Itching

## 2020-10-08 NOTE — PROGRESS NOTES
Asthma Neg Hx     Breast Cancer Neg Hx     Heart Failure Neg Hx     High Cholesterol Neg Hx     Hypertension Neg Hx     Migraines Neg Hx     Ovarian Cancer Neg Hx     Rashes/Skin Problems Neg Hx     Seizures Neg Hx     Stroke Neg Hx     Thyroid Disease Neg Hx        REVIEW OF SYSTEMS:   CONSTITUTIONAL: Denies unexplained weight loss, fevers, chills or fatigue  NEUROLOGICAL: Denies unsteady gait or progressive weakness  MUSCULOSKELETAL: Denies joint swelling or redness  GI: Denies nausea, vomiting, diarrhea   : Denies bowel or bladder issues       PHYSICAL EXAM:    Vitals: Temperature 97.4 °F (36.3 °C), resp. rate 12, height 5' 2.99\" (1.6 m), weight 167 lb 15.9 oz (76.2 kg), not currently breastfeeding. GENERAL EXAM:  · General Apparence: Patient is adequately groomed with no evidence of malnutrition. · Psychiatric: Orientation: The patient is oriented to time, place and person. The patient's mood and affect are appropriate   · Vascular: Examination reveals no swelling and palpation reveals no tenderness in upper or lower extremities. Good capillary refill. · The lymphatic examination of the neck, axillae and groin reveals all areas to be without enlargement or induration  · Sensation is intact without deficit in the upper and lower extremities to light touch and pinprick  · Coordination of the upper and lower extremities are normal.  · Additional Examinations:  · RIGHT UPPER EXTREMITY:  Inspection/examination of the right upper extremity does not show any tenderness, deformity or injury. Range of motion is unremarkable and pain-free. There is no gross instability. There are no rashes, ulcerations or lesions. Strength and tone are normal. No atrophy or abnormal movements are noted. · LEFT UPPER EXTREMITY: Inspection/examination of the left upper extremity does not show any tenderness, deformity or injury. Range of motion is unremarkable and pain-free. There is no gross instability.   There are no descending cauda equina nerve roots.  Mild abutment     of the inferior aspect of the exiting L4 nerve roots. 2. Marked multilevel facet hypertrophy with distension and capsulitis at L4-5 and L5-S1 and    mild facet hypertrophy with capsulitis throughout the remainder of the lumbar spine may    contribute to the patient's history of low back pain. Results for orders placed or performed in visit on 19   Human papillomavirus (HPV) DNA probe thin prep high risk   Result Value Ref Range    HPV TYPE 16 Not Detected Not Detected    HPV TYPE 18 Not Detected Not Detected    HPVOH (OTHER TYPES) Not Detected Not Detected    HPV Comment See below      Impression:       1. Sacrodynia    2. Coccygeal arthritis    3. Lumbar radiculitis    4. Spondylosis without myelopathy or radiculopathy, lumbar region        Plan:  Clinical Course: Above diagnoses are worsening    I discussed the diagnosis and the treatment options with [facility] today. In Summary:  The various treatment options were outlined and discussed with [place] [facility] including:  Conservative care options: physical therapy, ice, medications, bracing, and activity modification. The indications for therapeutic injections. The indications for additional imaging/laboratory studies. The indications for (possible future) interventions. After considering the various options discussed, Markie Minneapoliss elected to pursue a course of treatment that includes the followin. Medications:  No further recommendations for new medications. 2. PT:  Encouraged to continue with Home exercise program.    3. Further studies: COVID-19 PCR testing       4. Interventional:  We discussed pursuing a caudal lumbar RAYO epidural steroid injection to address the pain. Radiologic imaging and symptoms confirm the pain etiology. Risks, benefits and alternatives of interventional options were discussed.   These include and are not limited to bleeding, infection, spinal headache, nerve injury and lack of pain relief. The patient verbalized understanding and would like to proceed. The patient will be scheduled accordingly. 5. Follow up:  2-3 weeks      Russ Alvarado was instructed to call the office if her symptoms worsen or if new symptoms appear prior to the next scheduled visit. She is specifically instructed to contact the office between now & her scheduled appointment if she has concerns related to her condition or if she needs assistance in scheduling the above tests. She is welcome to call for an appointment sooner if she has any additional concerns or questions. Onel Mckenna. George Escobedo MD, DICK, [facility]  Board Certified in [address] [place] [address]  Certified and Fellowship Trained in [facility] ([facility])             This dictation was performed with a verbal recognition program [facility]) and it was checked for errors. It is possible that there are still dictated errors within this office note. If so, please bring any errors to my attention for an addendum. All efforts were made to ensure that this office note is accurate.

## 2020-10-22 ENCOUNTER — TELEPHONE (OUTPATIENT)
Dept: ORTHOPEDIC SURGERY | Age: 65
End: 2020-10-22

## 2020-10-22 NOTE — TELEPHONE ENCOUNTER
Auth: # [id number]    Date: 10/21/2020 thru 4/19/2021  Type of SX:  Outpatient RAYO  Location: [facility]  CPT: 86545   DX Code: M5.3, M48.9, M54.16, M47.816  SX area: [facility]  Insurance: [address] [place] [address]  Medicare     CPT: [address] [id number], [id number]

## 2020-10-30 ENCOUNTER — OFFICE VISIT (OUTPATIENT)
Dept: PRIMARY CARE CLINIC | Age: 65
End: 2020-10-30
Payer: MEDICARE

## 2020-10-30 PROCEDURE — 99211 OFF/OP EST MAY X REQ PHY/QHP: CPT | Performed by: NURSE PRACTITIONER

## 2020-10-30 NOTE — PROGRESS NOTES
Patient presented to [facility] drive up clinic for preop testing. Patient was swabbed and given information advising them to remain isolated until procedure date.

## 2020-10-30 NOTE — PROGRESS NOTES
PATIENT REACHED   YES__X__NO____    PREOP INSTUCTIONS LEFT ON VM NUMBER_______________  Patient instructed to get their COVID-19 test done as directed by their doctor (5-7 days prior to procedure)  or patient states will get on ___10/30_______. Patient was notified that they need to have an appointment,number to call provided. The day the COVID test is done is considered day one. Instructed to self quarantine after test until DOS. There is a one visitor policy at [facility] for all surgeries and endoscopies. Whether the visitor can stay or will be asked to wait in the car will depend on the current policy and if social distancing can be maintained. The policy is subject to change at any time. Please make sure the visitor has a cell phone that is on,charged and able to accept calls, as this may be the way that the staff communicates with them. Pain management is NO VISITOR policyThe patients ride is expected to remain in the car with a cell phone for communication. If the ride is leaving the hospital grounds please make sure they are back in time for pickup. Have the patient inform the staff on arrival what their rides plans are while the patient is in the facility. At the MAIN there is one visitor allowed. Please note that the visitor policy is subject to change. DATE__11/4_______ TIME___0845______ARRIVAL_0745_______PLACE__masc__________  NOTHING TO EAT OR DRINK  AFTER MIDNIGHT THE EVENING PRIOR OR AS INSTRUCTED BY YOUR DR.  Martin Zaragoza NEED A RESPONSIBLE ADULT AGE 18 OR OLDER TO DRIVE YOU HOME  PLEASE BRING INSURANCE CARD. PICTURE ID AND COMPLETE LIST OF MEDS  WEAR LOOSE COMFORTABLE CLOTHING  FOLLOW ANY INSTRUCTIONS YOUR DRS OFFICE HAS GIVEN YOU,INCLUDING WHAT MEDICATIONS TO TAKE THE AM OF PROCEDURE AND WHEN AND IF YOU NEED TO STOP ANY BLOOD THINNERS.  IF YOU HAVE QUESTIONS REGARDING THIS CALL THE OFFICE  THE GOAL BLOOD SUGAR THE AM OF PROCEDURE  OR LESS ABOVE THAT THE PROCEDURE MAY BE CANCELLED  ANY QUESTIONS CALL YOUR

## 2020-10-31 LAB — SARS-COV-2, PCR: NOT DETECTED

## 2020-11-04 ENCOUNTER — HOSPITAL ENCOUNTER (OUTPATIENT)
Age: 65
Setting detail: OUTPATIENT SURGERY
Discharge: HOME | End: 2020-11-04
Attending: PHYSICAL MEDICINE & REHABILITATION | Admitting: PHYSICAL MEDICINE & REHABILITATION
Payer: MEDICARE

## 2020-11-04 ENCOUNTER — APPOINTMENT (OUTPATIENT)
Dept: GENERAL RADIOLOGY | Age: 65
End: 2020-11-04
Attending: PHYSICAL MEDICINE & REHABILITATION
Payer: MEDICARE

## 2020-11-04 VITALS
HEART RATE: 66 BPM | WEIGHT: 160 LBS | HEIGHT: 63 IN | BODY MASS INDEX: 28.35 KG/M2 | RESPIRATION RATE: 12 BRPM | OXYGEN SATURATION: 95 % | SYSTOLIC BLOOD PRESSURE: 124 MMHG | DIASTOLIC BLOOD PRESSURE: 79 MMHG | TEMPERATURE: 97.5 F

## 2020-11-04 PROCEDURE — 3610000056 HC PAIN LEVEL 4 BASE (NON-OR): Performed by: PHYSICAL MEDICINE & REHABILITATION

## 2020-11-04 PROCEDURE — 77003 FLUOROGUIDE FOR SPINE INJECT: CPT

## 2020-11-04 PROCEDURE — 2580000003 HC RX 258: Performed by: PHYSICAL MEDICINE & REHABILITATION

## 2020-11-04 PROCEDURE — 6360000002 HC RX W HCPCS: Performed by: PHYSICAL MEDICINE & REHABILITATION

## 2020-11-04 PROCEDURE — 2709999900 HC NON-CHARGEABLE SUPPLY: Performed by: PHYSICAL MEDICINE & REHABILITATION

## 2020-11-04 PROCEDURE — 2500000003 HC RX 250 WO HCPCS: Performed by: PHYSICAL MEDICINE & REHABILITATION

## 2020-11-04 PROCEDURE — 99152 MOD SED SAME PHYS/QHP 5/>YRS: CPT | Performed by: PHYSICAL MEDICINE & REHABILITATION

## 2020-11-04 RX ORDER — LIDOCAINE HYDROCHLORIDE 10 MG/ML
INJECTION, SOLUTION EPIDURAL; INFILTRATION; INTRACAUDAL; PERINEURAL
Status: COMPLETED | OUTPATIENT
Start: 2020-11-04 | End: 2020-11-04

## 2020-11-04 RX ORDER — METHYLPREDNISOLONE ACETATE 80 MG/ML
INJECTION, SUSPENSION INTRA-ARTICULAR; INTRALESIONAL; INTRAMUSCULAR; SOFT TISSUE
Status: COMPLETED | OUTPATIENT
Start: 2020-11-04 | End: 2020-11-04

## 2020-11-04 RX ORDER — BUPIVACAINE HYDROCHLORIDE 5 MG/ML
INJECTION, SOLUTION EPIDURAL; INTRACAUDAL
Status: COMPLETED | OUTPATIENT
Start: 2020-11-04 | End: 2020-11-04

## 2020-11-04 RX ORDER — FENTANYL CITRATE 50 UG/ML
INJECTION, SOLUTION INTRAMUSCULAR; INTRAVENOUS
Status: COMPLETED | OUTPATIENT
Start: 2020-11-04 | End: 2020-11-04

## 2020-11-04 RX ORDER — MIDAZOLAM HYDROCHLORIDE 1 MG/ML
INJECTION INTRAMUSCULAR; INTRAVENOUS
Status: COMPLETED | OUTPATIENT
Start: 2020-11-04 | End: 2020-11-04

## 2020-11-04 RX ORDER — SODIUM CHLORIDE 9 MG/ML
INJECTION INTRAVENOUS
Status: COMPLETED | OUTPATIENT
Start: 2020-11-04 | End: 2020-11-04

## 2020-11-04 RX ORDER — LIDOCAINE HYDROCHLORIDE 10 MG/ML
INJECTION, SOLUTION INFILTRATION; PERINEURAL
Status: COMPLETED | OUTPATIENT
Start: 2020-11-04 | End: 2020-11-04

## 2020-11-04 ASSESSMENT — PAIN SCALES - GENERAL: PAINLEVEL_OUTOF10: 3

## 2020-11-04 ASSESSMENT — PAIN - FUNCTIONAL ASSESSMENT: PAIN_FUNCTIONAL_ASSESSMENT: 0-10

## 2020-11-04 ASSESSMENT — PAIN DESCRIPTION - DESCRIPTORS: DESCRIPTORS: DULL;ACHING

## 2020-11-04 NOTE — PROGRESS NOTES
IV discontinued, catheter intact, and dressing applied. Procedural dressing dry and intact. Bilateral upper extremities equal in strength. Discharge instructions reviewed with patient or responsible adult, signed and copy given. All home medications have been reviewed. All questions answered and patient or responsible adult verbalized understanding.   PAIN LEVEL AT DISCHARGE __3___

## 2020-11-04 NOTE — H&P
HISTORY AND PHYSICAL/PRE-SEDATION ASSESSMENT    Patient:  Kumar Gamble   :  1955  Medical Record No.:  [id number]   Date:  2020  Physician:  Cosme Severe, M.D. Facility: [address] [place] [address]    HISTORY OF PRESENT ILLNESS:                 The patient is a 76 y.o. female whom presents with lower back pain. Review of the imaging and physical exam of the patient confirmed the pre-procedure diagnosis. After a thorough discussion of risks, benefits and alternatives informed consent was obtained. Past Medical History:   Past Medical History:   Diagnosis Date    DDD (degenerative disc disease), lumbar 2019    Hemorrhoids, external     HIGH CHOLESTEROL     Hypertension     IBS (irritable bowel syndrome)     Right lumbar radiculitis 2019    Thoracic spine pain 2019      Past Surgical History:     Past Surgical History:   Procedure Laterality Date    CERVICAL FUSION      DILATION AND CURETTAGE OF UTERUS      HYSTERECTOMY      prolapsed uterus, still with ovaries    NERVE BLOCK LUMB FACET LEVEL 1 BILATERAL Right 3/11/2019    RIGHT L4/L5 FACET CYST ASPIRATE WITH FLUOROSCOPY (63254) performed by Cosme Severe, MD at [address] Right 2019    RIGHT L4/5 LUMBAR FACET CYST ASPIRATION WITH FLUOROSCOPY performed by Cosme Severe, MD at [facility] N/A 3/11/2020    CAUDAL LUMBAR EPIDURAL STEROID INJECTION performed by Cosme Severe, MD at [address]       Current Medications:   Prior to Admission medications    Medication Sig Start Date End Date Taking?  Authorizing Provider   chlorthalidone (HYGROTON) 25 MG tablet Take 25 mg by mouth daily 3/2/20  Yes Historical Provider, MD   Biotin 5000 MCG TABS Take by mouth   Yes Historical Provider, MD   estradiol (ESTRACE) 1 MG tablet Take 1 tablet by mouth daily 19  Yes Sole Hayden MD   omeprazole (PRILOSEC) 40 MG capsule Take 40 mg by mouth daily  6/9/14  Yes Historical Provider, MD   nabumetone (RELAFEN) 750 MG tablet Take 1 tablet by mouth 2 times daily 11/21/19   Luis Angel Rashid MD     Allergies:  Latex; Codeine; Tetanus toxoids; and Pravastatin  Social History:    reports that she has never smoked. She has never used smokeless tobacco. She reports current alcohol use. She reports that she does not use drugs. Family History:   Family History   Problem Relation Age of Onset    Cancer Mother     Diabetes Maternal Grandmother     Cancer Maternal Grandmother     Diabetes Paternal Grandmother     Rheum Arthritis Neg Hx     Osteoarthritis Neg Hx     Asthma Neg Hx     Breast Cancer Neg Hx     Heart Failure Neg Hx     High Cholesterol Neg Hx     Hypertension Neg Hx     Migraines Neg Hx     Ovarian Cancer Neg Hx     Rashes/Skin Problems Neg Hx     Seizures Neg Hx     Stroke Neg Hx     Thyroid Disease Neg Hx        Vitals: Blood pressure 118/80, pulse 72, temperature 97.5 °F (36.4 °C), temperature source Temporal, resp. rate 16, height 5' 3\" (1.6 m), weight 160 lb (72.6 kg), SpO2 98 %, not currently breastfeeding. PHYSICAL EXAM:including affected areas  HENT: Airway patent and reviewed  Cardiovascular: Normal rate, regular rhythm, normal heart sounds. Pulmonary/Chest: No wheezes. No rhonchi. No rales. Abdominal: Soft. Bowel sounds are normal. No distension. Extremities: Moves all extremities equally  Cervical and Lumbar Spine: Painful range of motion, no midline tenderness       Diagnosis:Lumbar radiculopathy  M53.3  M48.9  M54.16  M47.816    Plan: Proceed with planned procedure      ASA CLASS:         []   I. Normal, healthy adult           [x]   II.  Mild systemic disease            []   III.   Severe systemic disease      Mallampati: Mallampati Class II - (soft palate, fauces & uvula are visible)      Sedation plan:   [x]  Local              []  Minimal                  []  General anesthesia    Patient's condition acceptable for planned procedure/sedation. Post Procedure Plan   Return to same level of care   ______________________     The patient was counseled at length about the risks of clary Covid-19 in the dominik-operative and post-operative states including the recovery window of their procedure. The patient was made aware that clary Covid-19 after a surgical procedure may worsen their prognosis for recovering from the virus and lend to a higher morbidity and or mortality risk. The patient was given the options of postponing their procedure. All of the risks, benefits, and alternatives were discussed. The patient does wish to proceed with the procedure. The risks and benefits as well as alternatives to the procedure have been discussed with the patient and or family. The patient and or next of kin understands and agrees to proceed.     Mateo Germain M.D.

## 2020-11-04 NOTE — OP NOTE
Patient:  Taurus Shabazz  YOB: 1955  Medical Record #:  [id number]   Place:   [address] [place] [address], [address] [place] [address]  Date:  11/4/2020   Physician:  Kip Chung MD, DICK    Procedure:  Lumbar Epidural Steroid Injection - Caudal approach  + Coccygeal joint injection      Pre-Procedure Diagnosis: Lumbar radiculopathy and coxalgia     Post-Procedure Diagnosis: Same     Sedation: Local with 1% Lidocaine 3 ml and 1 mg of IV Versed and 25 mcg of IV Fentanyl     EBL: None     Complications: None     Procedure Summary:     The patient was brought to the procedure suite and placed in the prone position. The skin overlying the lumbar spine was prepped and draped in the usual sterile fashion. Using fluoroscopic guidance, the Caudal  interlaminar space was identified. Through anesthetized skin a 20 gauge Touhy needle was advanced into the epidural space using continuous loss of resistance to saline technique. Isovue M300 was instilled and an epidurogram was noted without evidence of intrathecal or vascular spread. 10 ml of a solution containing preservative free normal saline and 80 mg of Depomedrol was instilled. The needle was removed and a band-aid applied.     The coccygeal joint was identified with lateral fluoroscopy. A 22 gauge spinal needle was advanced to the joint space. Isovue M 300 was instilled. A mixture of 40 mg of depomedrol mixed with 0.5% marcaine was instilled.  The needle was removed and band-aids applied.      The patient was transferred to the post-operative area in stable condition.

## 2020-12-23 ENCOUNTER — OFFICE VISIT (OUTPATIENT)
Dept: ORTHOPEDIC SURGERY | Age: 65
End: 2020-12-23
Payer: MEDICARE

## 2020-12-23 ENCOUNTER — TELEPHONE (OUTPATIENT)
Dept: ORTHOPEDIC SURGERY | Age: 65
End: 2020-12-23

## 2020-12-23 ENCOUNTER — OFFICE VISIT (OUTPATIENT)
Dept: PRIMARY CARE CLINIC | Age: 65
End: 2020-12-23
Payer: MEDICARE

## 2020-12-23 VITALS — BODY MASS INDEX: 28.35 KG/M2 | HEIGHT: 63 IN | TEMPERATURE: 98.1 F | RESPIRATION RATE: 12 BRPM | WEIGHT: 160 LBS

## 2020-12-23 PROCEDURE — 99211 OFF/OP EST MAY X REQ PHY/QHP: CPT | Performed by: NURSE PRACTITIONER

## 2020-12-23 PROCEDURE — 99214 OFFICE O/P EST MOD 30 MIN: CPT | Performed by: PHYSICIAN ASSISTANT

## 2020-12-23 NOTE — TELEPHONE ENCOUNTER
Auth: # [id number]    Date: 12/23/2020 thru 6/21/2021  Type of SX: Outpatient RAYO  Location: [facility]  CPT: 60879, 47253   DX Code: M54.16, M47.816, M51.36, M53.3, M48.9  SX area: Lumbar spine  Insurance: [address] Medicare    CPT: [id number]  [facility]

## 2020-12-23 NOTE — PROGRESS NOTES
Follow up: Meri 89  1955  [id number]         Chief Complaint   Patient presents with    Lower Back Pain     F/u LESI caudal approach + coccygeal joint inj 11/4. No folllow up to injection 11/4. Pt states tailbone doing well, here tdday for lumbar pain         HISTORY OF PRESENT ILLNESS:  Ms. Antonia Berman is a 76 y.o. female returns for a follow up visit for multiple medical problems. Her current presenting problems are   1. Lumbar radiculitis    2. Spondylosis without myelopathy or radiculopathy, lumbar region    3. Degenerative disc disease, lumbar    4. Sacrodynia    5. Coccygeal arthritis    . As per information/history obtained from the PADT(patient assessment and documentation tool) - She complains of pain in the lower back with radiation to the upper leg Right and lower leg Right She rates the pain 8/10 and describes it as sharp, dull, aching, burning, stabbing. Pain is made worse by: lying down, steps. She denies side effects from the current pain regimen. Patient reports that since the last follow up visit the physical functioning is worse, family/social relationships are unchanged, mood is unchanged and sleep patterns are unchanged, and that the overall functioning is worse. Patient denies neurological bowel or bladder. The patient presents today in follow-up after a caudal epidural steroid injection was completed on 11/4/2020. The patient has yet to follow-up from this procedure. She describes that her tailbone pain is improving and states that the caudal epidural steroid injection provided 90% relief in that area, however her lower back pain is starting to aggravate. The patient describes that she is having lower back and radiating right leg pain which radiates down into the calf and ankle. The patient has had right L4-5 facet cyst aspirations in the past which has helped with her lower back pain on the right side but the leg pain is increasing and also worsening. The patient describes that lying down increases the leg pain considerably as well as climbing stairs. Associated signs and symptoms:   Neurogenic bowel or bladder symptoms:  no   Perceived weakness:  no   Difficulty walking:  no            Past medical, surgical, social and family history reviewed with the patient.     Past Medical History:   Past Medical History:   Diagnosis Date    DDD (degenerative disc disease), lumbar 2/5/2019    Hemorrhoids, external     HIGH CHOLESTEROL     Hypertension     IBS (irritable bowel syndrome)     Right lumbar radiculitis 2/5/2019    Thoracic spine pain 2/5/2019      Past Surgical History:     Past Surgical History:   Procedure Laterality Date    CERVICAL FUSION  2009    DILATION AND CURETTAGE OF UTERUS      HYSTERECTOMY      prolapsed uterus, still with ovaries    NERVE BLOCK LUMB FACET LEVEL 1 BILATERAL Right 3/11/2019    RIGHT L4/L5 FACET CYST ASPIRATE WITH FLUOROSCOPY (24197) performed by Darlin Brooks MD at [address] Right 12/16/2019    RIGHT L4/5 LUMBAR FACET CYST ASPIRATION WITH FLUOROSCOPY performed by Darlin Brooks MD at [address] [place] [address] N/A 3/11/2020    CAUDAL LUMBAR EPIDURAL STEROID INJECTION performed by Darlin Brooks MD at [facility] Vitals: Temperature 98.1 °F (36.7 °C), resp. rate 12, height 5' 3\" (1.6 m), weight 160 lb (72.6 kg), not currently breastfeeding. GENERAL EXAM:  · General Apparence: Patient is adequately groomed with no evidence of malnutrition. · Psychiatric: Orientation: The patient is oriented to time, place and person. The patient's mood and affect are appropriate   · Vascular: Examination reveals no swelling and palpation reveals no tenderness in upper or lower extremities. Good capillary refill. · The lymphatic examination of the neck, axillae and groin reveals all areas to be without enlargement or induration. · Sensation is intact without deficit in the upper and lower extremities to light touch. · Coordination of the upper and lower extremities are normal.  · RIGHT UPPER EXTREMITY:  Inspection/examination of the right upper extremity does not show any tenderness, deformity or injury. Range of motion is unremarkable and pain-free. There is no gross instability. There are no rashes, ulcerations or lesions. Strength and tone are normal. No atrophy or abnormal movements are noted. · LEFT UPPER EXTREMITY: Inspection/examination of the left upper extremity does not show any tenderness, deformity or injury. Range of motion is unremarkable and pain-free. There is no gross instability. There are no rashes, ulcerations or lesions. Strength and tone are normal. No atrophy or abnormal movements are noted. LUMBAR/SACRAL EXAMINATION:  · Inspection: Local inspection shows no step-off or bruising. Lumbar alignment is normal. No instability is noted. · Palpation:   No evidence of tenderness at the midline. Lumbar paraspinal tenderness: Mild L4/5 and L5/S1 tenderness  Bursal tenderness No tenderness bilaterally  There is no paraspinal spasm. · Range of Motion: limited by 50% in all planes due to pain  · Strength:   Strength testing is 5/5 in all muscle groups tested. T12-L1 through L1-2: No focal compressive disc herniations.  No central canal stenosis or nerve    root compression.  Neural foramina are patent.       L2-3 through L3-4: Shallow concentric noncompressive disc bulges and mild facet hypertrophy    with mild capsulitis mildly abuts the thecal sac without central canal stenosis or nerve root    compression.  Neural foramina are patent.       L4-5: A concentric disc displacement mildly eccentric to the left, prominent epidural    lipomatosis and marked medialized facet hypertrophy with distension and a right 5 mm medially    projecting synovial facet cyst moderate to markedly narrows the central canal with effacement    and displacement of the right greater than left descending cauda equina nerve roots.  Mild    bilateral foraminal stenosis with mild abutment of the inferior aspect of the exiting L4 nerve    roots.                   L5-S1: Shallow central disc protrusion abuts the thecal sac and gently encroaches upon the    descending S1 nerve roots.  Nominal central canal stenosis.  Neural foramina are patent.     Marked facet hypertrophy with capsulitis.       CONCLUSION:   1. A concentric L4-5 disc displacement mildly eccentric to the left, prominent epidural    lipomatosis, marked medialized facet hypertrophy with distension, and a right 5 mm medial    projecting synovial facet cyst moderate-markedly narrows the central canal with effacement and    displacement of the right-greater-than-left descending cauda equina nerve roots.  Mild abutment    of the inferior aspect of the exiting L4 nerve roots. 2. Marked multilevel facet hypertrophy with distension and capsulitis at L4-5 and L5-S1 and    mild facet hypertrophy with capsulitis throughout the remainder of the lumbar spine may    contribute to the patient's history of low back pain.      Results for orders placed or performed in visit on 10/30/20   COVID-19   Result Value Ref Range SARS-CoV-2, PCR Not Detected Not Detected     Impression:       1. Lumbar radiculitis    2. Spondylosis without myelopathy or radiculopathy, lumbar region    3. Degenerative disc disease, lumbar    4. Sacrodynia    5. Coccygeal arthritis        Plan:  Clinical Course: Above diagnoses are worsening diagnoses 1-3 ; unchanged diagnoses 4 and 5. I discussed the diagnosis and the treatment options with Young Jose today. In Summary:  The various treatment options were outlined and discussed with Hector Garcia including:  Conservative care options: physical therapy, ice, medications, bracing, and activity modification. The indications for therapeutic injections. The indications for additional imaging/laboratory studies. The indications for (possible future) interventions. After considering the various options discussed, Youngsondra Boboang elected to pursue a course of treatment that includes the followin. Medications:  No further recommendations for new medications. 2. PT:  Encouraged to continue with Home exercise program.    3. Further studies: No further studies. 4. Interventional:  We discussed pursuing a Right L4 and L5 TF epidural steroid injection to address the pain. Radiologic imaging and symptoms confirm the pain etiology. Risks, benefits and alternatives of interventional options were discussed. These include and are not limited to bleeding, infection, spinal headache, nerve injury and lack of pain relief. The patient verbalized understanding and would like to proceed. The patient will be scheduled accordingly. RAYO #2.     Repeat Therapeutic RAYO with positive relief from first therapeutic injection As such, I have confirmed the patient has met the general requirements including failure of conservative management including prescription strength analgesics, adjunctive medications were utilized , therapeutic exercise program, and no underlying addiction or behavioral disorders were identified to the ability of the provider. Symptoms are impacting their ADLs or iADLs such as walking and significant pain was noted in the HPI. Imaging studies as noted in the diagnostic imaging section of the consultation were reviewed and correlated with clinical findings. Fluoroscopy is utilized for interventional procedures. A repeat injection is being recommended due to at least 50% pain reduction and functional improvement of at least 3 weeks duration. The patient notes significant functional limitations and continues to adhere to conservative treatment between injections. 5. Follow up:  4-6 weeks      Catalino Barrow was instructed to call the office if her symptoms worsen or if new symptoms appear prior to the next scheduled visit. She is specifically instructed to contact the office between now & her scheduled appointment if she has concerns related to her condition or if she needs assistance in scheduling the above tests. She is welcome to call for an appointment sooner if she has any additional concerns or questions. SURJIT Clayton, PA-C  Board Certified by the [employer] on Certification of Physician Assistants  Thelma Waterman 57  Partner of [facility])         This dictation was performed with a verbal recognition program [facility]) and it was checked for errors. It is possible that there are still dictated errors within this office note. If so, please bring any errors to my attention for an addendum. All efforts were made to ensure that this office note is accurate.

## 2020-12-24 LAB — SARS-COV-2: NOT DETECTED

## 2020-12-28 ENCOUNTER — HOSPITAL ENCOUNTER (OUTPATIENT)
Age: 65
Setting detail: OUTPATIENT SURGERY
Discharge: HOME | End: 2020-12-28
Attending: PHYSICAL MEDICINE & REHABILITATION | Admitting: PHYSICAL MEDICINE & REHABILITATION
Payer: MEDICARE

## 2020-12-28 ENCOUNTER — APPOINTMENT (OUTPATIENT)
Dept: GENERAL RADIOLOGY | Age: 65
End: 2020-12-28
Attending: PHYSICAL MEDICINE & REHABILITATION
Payer: MEDICARE

## 2020-12-28 VITALS
SYSTOLIC BLOOD PRESSURE: 121 MMHG | BODY MASS INDEX: 28.88 KG/M2 | WEIGHT: 163 LBS | DIASTOLIC BLOOD PRESSURE: 81 MMHG | HEIGHT: 63 IN | RESPIRATION RATE: 20 BRPM | OXYGEN SATURATION: 99 % | HEART RATE: 71 BPM | TEMPERATURE: 97.6 F

## 2020-12-28 PROCEDURE — 2500000003 HC RX 250 WO HCPCS: Performed by: PHYSICAL MEDICINE & REHABILITATION

## 2020-12-28 PROCEDURE — 6360000002 HC RX W HCPCS: Performed by: PHYSICAL MEDICINE & REHABILITATION

## 2020-12-28 PROCEDURE — 3209999900 FLUORO FOR SURGICAL PROCEDURES

## 2020-12-28 PROCEDURE — 99152 MOD SED SAME PHYS/QHP 5/>YRS: CPT | Performed by: PHYSICAL MEDICINE & REHABILITATION

## 2020-12-28 PROCEDURE — 3610000056 HC PAIN LEVEL 4 BASE (NON-OR): Performed by: PHYSICAL MEDICINE & REHABILITATION

## 2020-12-28 PROCEDURE — 2709999900 HC NON-CHARGEABLE SUPPLY: Performed by: PHYSICAL MEDICINE & REHABILITATION

## 2020-12-28 RX ORDER — BETAMETHASONE SODIUM PHOSPHATE AND BETAMETHASONE ACETATE 3; 3 MG/ML; MG/ML
INJECTION, SUSPENSION INTRA-ARTICULAR; INTRALESIONAL; INTRAMUSCULAR; SOFT TISSUE
Status: COMPLETED | OUTPATIENT
Start: 2020-12-28 | End: 2020-12-28

## 2020-12-28 RX ORDER — FENTANYL CITRATE 50 UG/ML
INJECTION, SOLUTION INTRAMUSCULAR; INTRAVENOUS
Status: COMPLETED | OUTPATIENT
Start: 2020-12-28 | End: 2020-12-28

## 2020-12-28 RX ORDER — MIDAZOLAM HYDROCHLORIDE 1 MG/ML
INJECTION INTRAMUSCULAR; INTRAVENOUS
Status: COMPLETED | OUTPATIENT
Start: 2020-12-28 | End: 2020-12-28

## 2020-12-28 RX ORDER — LIDOCAINE HYDROCHLORIDE 10 MG/ML
INJECTION, SOLUTION INFILTRATION; PERINEURAL
Status: COMPLETED | OUTPATIENT
Start: 2020-12-28 | End: 2020-12-28

## 2020-12-28 RX ORDER — BUPIVACAINE HYDROCHLORIDE 5 MG/ML
INJECTION, SOLUTION EPIDURAL; INTRACAUDAL
Status: COMPLETED | OUTPATIENT
Start: 2020-12-28 | End: 2020-12-28

## 2020-12-28 ASSESSMENT — PAIN DESCRIPTION - ORIENTATION
ORIENTATION: LOWER
ORIENTATION: LOWER

## 2020-12-28 ASSESSMENT — PAIN DESCRIPTION - FREQUENCY: FREQUENCY: CONTINUOUS

## 2020-12-28 ASSESSMENT — PAIN DESCRIPTION - PAIN TYPE
TYPE: CHRONIC PAIN
TYPE: CHRONIC PAIN

## 2020-12-28 ASSESSMENT — PAIN DESCRIPTION - DESCRIPTORS
DESCRIPTORS: ACHING;DULL
DESCRIPTORS: BURNING;DULL;SHARP

## 2020-12-28 ASSESSMENT — PAIN - FUNCTIONAL ASSESSMENT
PAIN_FUNCTIONAL_ASSESSMENT: PREVENTS OR INTERFERES SOME ACTIVE ACTIVITIES AND ADLS
PAIN_FUNCTIONAL_ASSESSMENT: 0-10

## 2020-12-28 ASSESSMENT — PAIN SCALES - GENERAL
PAINLEVEL_OUTOF10: 4
PAINLEVEL_OUTOF10: 4

## 2020-12-28 ASSESSMENT — PAIN DESCRIPTION - LOCATION
LOCATION: BACK
LOCATION: BACK

## 2020-12-28 ASSESSMENT — PAIN DESCRIPTION - PROGRESSION: CLINICAL_PROGRESSION: NOT CHANGED

## 2020-12-28 ASSESSMENT — PAIN DESCRIPTION - ONSET: ONSET: ON-GOING

## 2020-12-28 NOTE — PROGRESS NOTES
Arrived in phase 2. Patient awake, alert and oriented. Pain level 4 to lower back. Vss. No distress noted.  Isidoro Hayes RN

## 2020-12-28 NOTE — H&P
HISTORY AND PHYSICAL/PRE-SEDATION ASSESSMENT    Patient:  Kumar Gamble   :  1955  Medical Record No.:  [id number]   Date:  2020  Physician:  Cosme Severe, M.D. Facility: [address] [place] [address]    HISTORY OF PRESENT ILLNESS:                 The patient is a 76 y.o. female whom presents with low back and right leg pain. Review of the imaging and physical exam of the patient confirmed the pre-procedure diagnosis. After a thorough discussion of risks, benefits and alternatives informed consent was obtained. Past Medical History:   Past Medical History:   Diagnosis Date    DDD (degenerative disc disease), lumbar 2019    Hemorrhoids, external     HIGH CHOLESTEROL     Hypertension     IBS (irritable bowel syndrome)     Right lumbar radiculitis 2019    Thoracic spine pain 2019      Past Surgical History:     Past Surgical History:   Procedure Laterality Date    CERVICAL FUSION      DILATION AND CURETTAGE OF UTERUS      HYSTERECTOMY      prolapsed uterus, still with ovaries    NERVE BLOCK LUMB FACET LEVEL 1 BILATERAL Right 3/11/2019    RIGHT L4/L5 FACET CYST ASPIRATE WITH FLUOROSCOPY (36994) performed by Cosme Severe, MD at [address] Right 2019    RIGHT L4/5 LUMBAR FACET CYST ASPIRATION WITH FLUOROSCOPY performed by Cosme Severe, MD at [address] [place] [address] N/A 3/11/2020    CAUDAL LUMBAR EPIDURAL STEROID INJECTION performed by Cosme Severe, MD at [address] [place] [address] N/A 2020    CAUDAL LUMBAR EPIDURAL STEROID INJECTION WITH FLUOROSCOPY performed by Cosme Severe, MD at [address] [place] [address] N/A 2020    AND COCCYGEAL JOINT INJECTION WITH FLUOROSCOPY performed by Cosme Severe, MD at [address] [facility]       Current Medications:   Prior to Admission medications    Medication Sig Start Date End Date Taking?  Authorizing Provider   estradiol (ESTRACE) 1 MG tablet TAKE ONE TABLET BY MOUTH DAILY 12/1/20  Yes Jarrett Disla MD   chlorthalidone (HYGROTON) 25 MG tablet Take 25 mg by mouth daily 3/2/20  Yes Historical Provider, MD   Biotin 5000 MCG TABS Take by mouth   Yes Historical Provider, MD   omeprazole (PRILOSEC) 40 MG capsule Take 40 mg by mouth daily  6/9/14  Yes Historical Provider, MD     Allergies:  Latex, Codeine, Tetanus toxoids, and Pravastatin  Social History:    reports that she has never smoked. She has never used smokeless tobacco. She reports current alcohol use. She reports that she does not use drugs. Family History:   Family History   Problem Relation Age of Onset    Cancer Mother     Diabetes Maternal Grandmother     Cancer Maternal Grandmother     Diabetes Paternal Grandmother     Rheum Arthritis Neg Hx     Osteoarthritis Neg Hx     Asthma Neg Hx     Breast Cancer Neg Hx     Heart Failure Neg Hx     High Cholesterol Neg Hx     Hypertension Neg Hx     Migraines Neg Hx     Ovarian Cancer Neg Hx     Rashes/Skin Problems Neg Hx     Seizures Neg Hx     Stroke Neg Hx     Thyroid Disease Neg Hx        Vitals: Blood pressure 127/74, pulse 65, temperature 97.6 °F (36.4 °C), temperature source Temporal, resp. rate 16, height 5' 3\" (1.6 m), weight 163 lb (73.9 kg), SpO2 99 %, not currently breastfeeding. PHYSICAL EXAM:including affected areas  Cardiovascular: Normal rate, regular rhythm, normal heart sounds. Pulmonary/Chest: No wheezes. Extremities: Moves all extremities equally  Cervical and Lumbar Spine: Painful range of motion, no midline tenderness       Diagnosis:Lumbar radiculopathy  M54.16   M47.816   M51.36   M53.3   M48.9    Plan: Proceed with planned procedure      ASA CLASS:         []   I. Normal, healthy adult           [x]   II.  Mild systemic disease            []   III.   Severe systemic disease      Mallampati: Mallampati Class II - (soft palate, fauces & uvula are visible)      Sedation plan:   [x]  Local []  Minimal                  []  General anesthesia    Patient's condition acceptable for planned procedure/sedation. Post Procedure Plan   Return to same level of care   ______________________     The patient was counseled at length about the risks of clary Covid-19 in the dominik-operative and post-operative states including the recovery window of their procedure. The patient was made aware that clary Covid-19 after a surgical procedure may worsen their prognosis for recovering from the virus and lend to a higher morbidity and or mortality risk. The patient was given the options of postponing their procedure. All of the risks, benefits, and alternatives were discussed. The patient does wish to proceed with the procedure. The risks and benefits as well as alternatives to the procedure have been discussed with the patient and or family. The patient and or next of kin understands and agrees to proceed.     Elsie Brothers M.D.

## 2021-01-13 ENCOUNTER — OFFICE VISIT (OUTPATIENT)
Dept: ORTHOPEDIC SURGERY | Age: 66
End: 2021-01-13
Payer: MEDICARE

## 2021-01-13 VITALS — WEIGHT: 162.92 LBS | RESPIRATION RATE: 14 BRPM | HEIGHT: 63 IN | BODY MASS INDEX: 28.87 KG/M2 | TEMPERATURE: 96.9 F

## 2021-01-13 DIAGNOSIS — M53.3 SACROILIAC JOINT DYSFUNCTION OF RIGHT SIDE: ICD-10-CM

## 2021-01-13 DIAGNOSIS — M47.816 SPONDYLOSIS WITHOUT MYELOPATHY OR RADICULOPATHY, LUMBAR REGION: ICD-10-CM

## 2021-01-13 DIAGNOSIS — M51.36 DEGENERATIVE DISC DISEASE, LUMBAR: ICD-10-CM

## 2021-01-13 DIAGNOSIS — M54.16 LUMBAR RADICULITIS: Primary | ICD-10-CM

## 2021-01-13 PROCEDURE — 99213 OFFICE O/P EST LOW 20 MIN: CPT | Performed by: STUDENT IN AN ORGANIZED HEALTH CARE EDUCATION/TRAINING PROGRAM

## 2021-01-13 RX ORDER — MELOXICAM 15 MG/1
15 TABLET ORAL DAILY PRN
Qty: 30 TABLET | Refills: 5 | Status: SHIPPED | OUTPATIENT
Start: 2021-01-13 | End: 2021-04-22 | Stop reason: ALTCHOICE

## 2021-01-13 NOTE — PROGRESS NOTES
Ms. Tuyet Mustafa presents for follow-up of ongoing low back and right leg pain. The patient underwent a right L4 and L5 transforaminal epidural steroid injection on 12/28/2020. She reports 60% relief following this procedure. She states the right buttock pain and lower back pain has decreased following the injection. The burning in the right leg also dissipated following the injection. The patient does continue to complain of aching pain over the right upper leg and in the right buttock. The patient is unable to cross her legs due to pain near the right SI joint. The patient states the pain near the right SI joint is worse with lying down as well. Sitting still helps alleviate her pain. Patient has had physical therapy in the past however she has never had dry needling. She is willing to try dry needling for the continued pain near the SI joint. The patient is leaving for [place] this coming Tuesday.     Associated signs and symptoms:   Neurogenic bowel or bladder symptoms:  no   Perceived weakness:  no   Difficulty walking:  no              Past Medical History:   Past Medical History:   Diagnosis Date    DDD (degenerative disc disease), lumbar 2/5/2019    Hemorrhoids, external     HIGH CHOLESTEROL     Hypertension     IBS (irritable bowel syndrome)     Right lumbar radiculitis 2/5/2019    Thoracic spine pain 2/5/2019      Past Surgical History:     Past Surgical History:   Procedure Laterality Date    CERVICAL FUSION  2009    DILATION AND CURETTAGE OF UTERUS      HYSTERECTOMY      prolapsed uterus, still with ovaries    NERVE BLOCK LUMB FACET LEVEL 1 BILATERAL Right 3/11/2019    RIGHT L4/L5 FACET CYST ASPIRATE WITH FLUOROSCOPY (69655) performed by Aneta Escobar MD at [address] Right 12/16/2019    RIGHT L4/5 LUMBAR FACET CYST ASPIRATION WITH FLUOROSCOPY performed by Aneta Escobar MD at [address] [place] [address] N/A 3/11/2020 CAUDAL LUMBAR EPIDURAL STEROID INJECTION performed by Jerry Johansen MD at [address] [place] [address] N/A 11/4/2020    CAUDAL LUMBAR EPIDURAL STEROID INJECTION WITH FLUOROSCOPY performed by Jerry Johansen MD at [address] [place] [address] N/A 11/4/2020    AND COCCYGEAL JOINT INJECTION WITH FLUOROSCOPY performed by Jerry Johansen MD at [address] [place] [address] Right 12/28/2020    RIGHT L4 AND L5 TRANSFORAMINAL EPIDURAL STEROID INJECTION WITH FLUOROSCOPY performed by Jerry Johansen MD at [address] [address] [facility] [address]  2006     Current Medications:     Current Outpatient Medications:     meloxicam (MOBIC) 15 MG tablet, Take 1 tablet by mouth daily as needed for Pain, Disp: 30 tablet, Rfl: 5    estradiol (ESTRACE) 1 MG tablet, TAKE ONE TABLET BY MOUTH DAILY, Disp: 90 tablet, Rfl: 3    chlorthalidone (HYGROTON) 25 MG tablet, Take 25 mg by mouth daily, Disp: , Rfl:     Biotin 5000 MCG TABS, Take by mouth, Disp: , Rfl:     omeprazole (PRILOSEC) 40 MG capsule, Take 40 mg by mouth daily , Disp: , Rfl:   Allergies:  Latex, Codeine, Tetanus toxoids, and Pravastatin  Social History:    reports that she has never smoked. She has never used smokeless tobacco. She reports current alcohol use. She reports that she does not use drugs.   Family History:   Family History   Problem Relation Age of Onset    Cancer Mother     Diabetes Maternal Grandmother     Cancer Maternal Grandmother     Diabetes Paternal Grandmother     Rheum Arthritis Neg Hx     Osteoarthritis Neg Hx     Asthma Neg Hx     Breast Cancer Neg Hx     Heart Failure Neg Hx     High Cholesterol Neg Hx     Hypertension Neg Hx     Migraines Neg Hx     Ovarian Cancer Neg Hx     Rashes/Skin Problems Neg Hx     Seizures Neg Hx     Stroke Neg Hx     Thyroid Disease Neg Hx        REVIEW OF SYSTEMS:   CONSTITUTIONAL: Denies unexplained weight loss, fevers, chills or fatigue NEUROLOGICAL: Denies unsteady gait or progressive weakness  MUSCULOSKELETAL: Denies joint swelling or redness  GI: Denies nausea, vomiting, diarrhea   : Denies bowel or bladder issues       PHYSICAL EXAM:    Vitals: Temperature 96.9 °F (36.1 °C), temperature source Infrared, resp. rate 14, height 5' 2.99\" (1.6 m), weight 162 lb 14.7 oz (73.9 kg), not currently breastfeeding. GENERAL EXAM:  · General Apparence: Patient is adequately groomed with no evidence of malnutrition. · Psychiatric: Orientation: The patient is oriented to time, place and person. The patient's mood and affect are appropriate   · Vascular: Examination reveals no swelling and palpation reveals no tenderness in upper or lower extremities. Good capillary refill. · The lymphatic examination of the neck, axillae and groin reveals all areas to be without enlargement or induration  · Sensation is intact without deficit in the upper and lower extremities to light touch and pinprick  · Coordination of the upper and lower extremities are normal.  · RIGHT UPPER EXTREMITY:  Inspection/examination of the right upper extremity does not show any tenderness, deformity or injury. Range of motion is unremarkable and pain-free. There is no gross instability. There are no rashes, ulcerations or lesions. Strength and tone are normal. No atrophy or abnormal movements are noted. · LEFT UPPER EXTREMITY: Inspection/examination of the left upper extremity does not show any tenderness, deformity or injury. Range of motion is unremarkable and pain-free. There is no gross instability. There are no rashes, ulcerations or lesions. Strength and tone are normal. No atrophy or abnormal movements are noted. LUMBAR/SACRAL EXAMINATION:  · Inspection: Local inspection shows no step-off or bruising. Lumbar alignment is normal. No instability is noted. This dictation was performed with a verbal recognition program ([employer]) and it was checked for errors. It is possible that there are still dictated errors within this office note. If so, please bring any errors to my attention for an addendum. All efforts were made to ensure that this office note is accurate.

## 2021-01-14 ENCOUNTER — TELEPHONE (OUTPATIENT)
Dept: ORTHOPEDIC SURGERY | Age: 66
End: 2021-01-14

## 2021-04-22 ENCOUNTER — OFFICE VISIT (OUTPATIENT)
Dept: GYNECOLOGY | Age: 66
End: 2021-04-22
Payer: MEDICARE

## 2021-04-22 VITALS
BODY MASS INDEX: 30.16 KG/M2 | RESPIRATION RATE: 17 BRPM | OXYGEN SATURATION: 95 % | DIASTOLIC BLOOD PRESSURE: 70 MMHG | HEIGHT: 63 IN | HEART RATE: 68 BPM | SYSTOLIC BLOOD PRESSURE: 122 MMHG | WEIGHT: 170.25 LBS | TEMPERATURE: 97.8 F

## 2021-04-22 DIAGNOSIS — Z78.0 MENOPAUSE: Primary | ICD-10-CM

## 2021-04-22 DIAGNOSIS — Z12.31 ENCOUNTER FOR SCREENING MAMMOGRAM FOR MALIGNANT NEOPLASM OF BREAST: ICD-10-CM

## 2021-04-22 PROCEDURE — 99213 OFFICE O/P EST LOW 20 MIN: CPT | Performed by: OBSTETRICS & GYNECOLOGY

## 2021-04-22 RX ORDER — ESTRADIOL 1 MG/1
1 TABLET ORAL DAILY
Qty: 90 TABLET | Refills: 3 | Status: SHIPPED | OUTPATIENT
Start: 2021-04-22 | End: 2022-03-03 | Stop reason: ALTCHOICE

## 2021-04-22 RX ORDER — LOSARTAN POTASSIUM 50 MG/1
50 TABLET ORAL DAILY
COMMUNITY
End: 2022-03-03 | Stop reason: ALTCHOICE

## 2021-04-22 ASSESSMENT — ENCOUNTER SYMPTOMS
EYES NEGATIVE: 1
RESPIRATORY NEGATIVE: 1
GASTROINTESTINAL NEGATIVE: 1

## 2021-04-23 NOTE — PROGRESS NOTES
Subjective:      Patient ID: Candis Shaffer is a 76 y.o. female. Patient in menopause. Here for HRT. Gynecologic Exam        Review of Systems   Constitutional: Negative. HENT: Negative. Eyes: Negative. Respiratory: Negative. Cardiovascular: Negative. Gastrointestinal: Negative. Genitourinary: Negative. Musculoskeletal: Negative. Skin: Negative. Neurological: Negative. Psychiatric/Behavioral: Negative.       Date of Birth 1955  Past Medical History:   Diagnosis Date    DDD (degenerative disc disease), lumbar 2019    Hemorrhoids, external     HIGH CHOLESTEROL     Hypertension     IBS (irritable bowel syndrome)     Right lumbar radiculitis 2019    Thoracic spine pain 2019     Past Surgical History:   Procedure Laterality Date    CERVICAL FUSION      DILATION AND CURETTAGE OF UTERUS      HYSTERECTOMY      prolapsed uterus, still with ovaries    NERVE BLOCK LUMB FACET LEVEL 1 BILATERAL Right 3/11/2019    RIGHT L4/L5 FACET CYST ASPIRATE WITH FLUOROSCOPY (92015) performed by Shana Mckeon MD at [address] Right 2019    RIGHT L4/5 LUMBAR FACET CYST ASPIRATION WITH FLUOROSCOPY performed by Shana Mckeon MD at [facility] N/A 3/11/2020    CAUDAL LUMBAR EPIDURAL STEROID INJECTION performed by hSana Mckeon MD at [facility] N/A 2020    CAUDAL LUMBAR EPIDURAL STEROID INJECTION WITH FLUOROSCOPY performed by Shana Mckeon MD at [address] [address] N/A 2020    AND COCCYGEAL JOINT INJECTION WITH FLUOROSCOPY performed by Shana Mckeon MD at [facility] Right 2020    RIGHT L4 AND L5 TRANSFORAMINAL EPIDURAL STEROID INJECTION WITH FLUOROSCOPY performed by Shana Mckeon MD at [address] [facility] [address]       OB History    Para Term  AB Living   2 2 2     2   SAB TAB Ectopic Molar Multiple Live Births 2      # Outcome Date GA Lbr Santiago/2nd Weight Sex Delivery Anes PTL Lv   2 Term 07/13/83 40w0d   M    JIMENEZ   1 Term 03/29/77 40w0d   M    JIMENEZ     Social History     Socioeconomic History    Marital status:      Spouse name: Not on file    Number of children: Not on file    Years of education: Not on file    Highest education level: Not on file   Occupational History    Not on file   Social Needs    Financial resource strain: Not on file    Food insecurity     Worry: Not on file     Inability: Not on file   [employer] needs     Medical: Not on file     Non-medical: Not on file   Tobacco Use    Smoking status: Never Smoker    Smokeless tobacco: Never Used   Substance and Sexual Activity    Alcohol use:  Yes     Alcohol/week: 0.0 standard drinks     Comment: Occasions    Drug use: No    Sexual activity: Yes     Partners: Male   Lifestyle    Physical activity     Days per week: Not on file     Minutes per session: Not on file    Stress: Not on file   Relationships    Social connections     Talks on phone: Not on file     Gets together: Not on file     Attends [employer] service: Not on file     Active member of club or organization: Not on file     Attends meetings of clubs or organizations: Not on file     Relationship status: Not on file    Intimate partner violence     Fear of current or ex partner: Not on file     Emotionally abused: Not on file     Physically abused: Not on file     Forced sexual activity: Not on file   Other Topics Concern    Not on file   Social History Narrative    Not on file     Allergies   Allergen Reactions    Latex Hives, Itching and Rash     \"around the site\"    Codeine Hives    Tetanus Toxoids Swelling    Pravastatin Itching     Outpatient Medications Marked as Taking for the 4/22/21 encounter (Office Visit) with Rachel Casey MD   Medication Sig Dispense Refill    losartan (COZAAR) 50 MG tablet Take 50 mg by mouth daily      estradiol (ESTRACE) 1 MG tablet Take 1 tablet by mouth daily 90 tablet 3    estradiol (ESTRACE) 1 MG tablet TAKE ONE TABLET BY MOUTH DAILY 90 tablet 3    chlorthalidone (HYGROTON) 25 MG tablet Take 25 mg by mouth daily      Biotin 5000 MCG TABS Take by mouth      omeprazole (PRILOSEC) 40 MG capsule Take 40 mg by mouth daily        Family History   Problem Relation Age of Onset    Cancer Mother     Diabetes Maternal Grandmother     Cancer Maternal Grandmother     Diabetes Paternal Grandmother     Rheum Arthritis Neg Hx     Osteoarthritis Neg Hx     Asthma Neg Hx     Breast Cancer Neg Hx     Heart Failure Neg Hx     High Cholesterol Neg Hx     Hypertension Neg Hx     Migraines Neg Hx     Ovarian Cancer Neg Hx     Rashes/Skin Problems Neg Hx     Seizures Neg Hx     Stroke Neg Hx     Thyroid Disease Neg Hx      /70 (Site: Right Upper Arm, Position: Sitting, Cuff Size: Medium Adult)   Pulse 68   Temp 97.8 °F (36.6 °C) (Oral)   Resp 17   Ht 5' 3\" (1.6 m)   Wt 170 lb 4 oz (77.2 kg)   SpO2 95%   Breastfeeding No   BMI 30.16 kg/m²         Objective:   Physical Exam  Constitutional:       General: She is not in acute distress. Appearance: Normal appearance. She is well-developed and normal weight. She is not diaphoretic. HENT:      Head: Normocephalic. Nose: Nose normal.      Mouth/Throat:      Mouth: Mucous membranes are moist.      Pharynx: Oropharynx is clear. Eyes:      Pupils: Pupils are equal, round, and reactive to light. Neck:      Musculoskeletal: Normal range of motion and neck supple. No neck rigidity. Thyroid: No thyromegaly. Cardiovascular:      Rate and Rhythm: Normal rate and regular rhythm. Heart sounds: Normal heart sounds. No murmur. No friction rub. No gallop. Pulmonary:      Effort: Pulmonary effort is normal. No respiratory distress. Breath sounds: Normal breath sounds. No wheezing or rales. Chest:      Chest wall: No tenderness.       Breasts: Right: No swelling, bleeding, inverted nipple, mass, nipple discharge, skin change or tenderness. Left: No swelling, bleeding, inverted nipple, mass, nipple discharge, skin change or tenderness. Abdominal:      General: Abdomen is flat. There is no distension. Palpations: Abdomen is soft. There is no hepatomegaly or mass. Tenderness: There is no abdominal tenderness. There is no guarding or rebound. Hernia: No hernia is present. There is no hernia in the left inguinal area or right inguinal area. Genitourinary:     Exam position: Lithotomy position. Labia:         Right: No rash, tenderness, lesion or injury. Left: No rash, tenderness, lesion or injury. Urethra: No prolapse, urethral pain, urethral swelling or urethral lesion. Vagina: Normal. No signs of injury and foreign body. No vaginal discharge, erythema, tenderness, bleeding or lesions. Adnexa: Right adnexa normal and left adnexa normal.        Right: No mass, tenderness or fullness. Left: No mass, tenderness or fullness. Rectum: Normal. Guaiac result negative. No mass, tenderness, anal fissure, external hemorrhoid or internal hemorrhoid. Normal anal tone. Comments: Normal urethral meatus, nl urethra, nl bladder. Musculoskeletal: Normal range of motion. General: No tenderness. Lymphadenopathy:      Cervical: No cervical adenopathy. Skin:     General: Skin is warm and dry. Neurological:      General: No focal deficit present. Mental Status: She is alert and oriented to person, place, and time. Mental status is at baseline. Deep Tendon Reflexes: Reflexes are normal and symmetric. Psychiatric:         Mood and Affect: Mood normal.         Behavior: Behavior normal.         Thought Content: Thought content normal.         Judgment: Judgment normal.         Assessment:      1. Menopause  2.  Vaginal wall prolapse      Plan:      1. calcium, exercise, mammogram, hemocult negative. Stable-estrace 1 mg daily  3.  Casimiro Figueroa MD

## 2021-10-11 NOTE — TELEPHONE ENCOUNTER
Pt would need to schedule an office appointment with Daysi Grijalva or reach out to her primary care as the medication was discontinued by pts obgyn.      Disp Refills Start End     meloxicam (MOBIC) 15 MG tablet (Discontinued) 30 tablet 5 1/13/2021 4/22/2021

## 2021-11-08 ENCOUNTER — HOSPITAL ENCOUNTER (OUTPATIENT)
Dept: WOMENS IMAGING | Age: 66
Discharge: HOME | End: 2021-11-08
Payer: MEDICARE

## 2021-11-08 DIAGNOSIS — Z12.31 ENCOUNTER FOR SCREENING MAMMOGRAM FOR MALIGNANT NEOPLASM OF BREAST: ICD-10-CM

## 2021-11-08 PROCEDURE — 77063 BREAST TOMOSYNTHESIS BI: CPT

## 2021-11-22 DIAGNOSIS — R92.2 BREAST DENSITY: Primary | ICD-10-CM

## 2021-12-07 ENCOUNTER — HOSPITAL ENCOUNTER (OUTPATIENT)
Dept: ULTRASOUND IMAGING | Age: 66
Discharge: HOME | End: 2021-12-07
Payer: MEDICARE

## 2021-12-07 ENCOUNTER — HOSPITAL ENCOUNTER (OUTPATIENT)
Dept: WOMENS IMAGING | Age: 66
Discharge: HOME | End: 2021-12-07
Payer: MEDICARE

## 2021-12-07 DIAGNOSIS — R92.8 ABNORMAL MAMMOGRAM: ICD-10-CM

## 2021-12-07 DIAGNOSIS — R92.2 BREAST DENSITY: ICD-10-CM

## 2021-12-07 PROCEDURE — G0279 TOMOSYNTHESIS, MAMMO: HCPCS

## 2021-12-07 PROCEDURE — 76642 ULTRASOUND BREAST LIMITED: CPT

## 2021-12-27 DIAGNOSIS — R92.2 BREAST DENSITY: Primary | ICD-10-CM

## 2022-03-03 RX ORDER — LORATADINE 10 MG/1
10 TABLET ORAL DAILY PRN
COMMUNITY

## 2022-03-03 RX ORDER — ACETAMINOPHEN 160 MG
TABLET,DISINTEGRATING ORAL DAILY
COMMUNITY

## 2022-03-03 RX ORDER — POTASSIUM CHLORIDE 1.5 G/1.77G
20 POWDER, FOR SOLUTION ORAL 2 TIMES DAILY
COMMUNITY

## 2022-03-03 NOTE — PROGRESS NOTES
[facility] time______1130______        Surgery time______1300______    Take the following medications with a sip of water: Follow your MD/Surgeons pre-procedure instructions regarding your medications    Do not eat or drink anything after 12:00 midnight prior to your surgery. This includes water chewing gum, mints and ice chips. You may brush your teeth and gargle the morning of your surgery, but do not swallow the water     Please see your family doctor/pediatrician for a history and physical and/or concerning medications. Bring any test results/reports from your physicians office. If you are under the care of a heart doctor or specialist doctor, please be aware that you may be asked to them for clearance    You may be asked to stop blood thinners such as Coumadin, Plavix, Fragmin, Lovenox, etc., or any anti-inflammatories such as:  Aspirin, Ibuprofen, Advil, Naproxen prior to your surgery. We also ask that you stop any OTC medications such as fish oil, vitamin E, glucosamine, garlic, Multivitamins, COQ 10, etc.    We ask that you do not smoke 24 hours prior to surgery  We ask that you do not  drink any alcoholic beverages 24 hours prior to surgery     You must make arrangements for a responsible adult to take you home after your surgery. For your safety you will not be allowed to leave alone or drive yourself home. Your surgery will be cancelled if you do not have a ride home. Also for your safety, it is strongly suggested that someone stay with you the first 24 hours after your surgery. A parent or legal guardian must accompany a child scheduled for surgery and plan to stay at the hospital until the child is discharged. Please do not bring other children with you. For your comfort, please wear simple loose fitting clothing to the hospital.  Please do not bring valuables.     Do not wear any make-up or nail polish on your fingers or toes      For your safety, please do not wear any jewelry or body piercing's on the day of surgery. All jewelry must be removed. If you have dentures, they will be removed before going to operating room. For your convenience, we will provide you with a container. If you wear contact lenses or glasses, they will be removed, please bring a case for them. If you have a living will and a durable power of  for healthcare, please bring in a copy. As part of our patient safety program to minimize surgical site infections, we ask you to do the following:    · Please notify your surgeon if you develop any illness between         now and the  day of your surgery. · This includes a cough, cold, fever, sore throat, nausea,         or vomiting, and diarrhea, etc.  ·  Please notify your surgeon if you experience dizziness, shortness         of breath or blurred vision between now and the time of your surgery. Do not shave your operative site 96 hours prior to surgery. For face and neck surgery, men may use an electric razor 48 hours   prior to surgery. You may shower the night before surgery or the morning of   your surgery with an antibacterial soap. You will need to bring a photo ID and insurance card    [address] [facility] has an onsite pharmacy, would you like to utilize our pharmacy     If you will be staying overnight and use a C-pap machine, please bring   your C-pap to hospital     Our goal is to provide you with excellent care, therefore, visitors will be limited to two(2) in the room at a time so that we may focus on providing this care for you. Please contact pre-admission testing if you have any further questions. [facility]  phone number:  [phone] PAT fax number:  [phone]  Please note these are generalized instructions for all surgical cases, you may be provided with more specific instructions according to your surgery.

## 2022-03-03 NOTE — PROGRESS NOTES
C-Difficile admission screening and protocol:     * Admitted with diarrhea? YES____    NO__X___     *Prior history of C-Diff. In last 3 months? YES____   NO__X___     *Antibiotic use in the past 6-8 weeks? NO___X___YES______                 If yes which  ANTIBIOTIC AND REASON______     *Prior hospitalization or nursing home in the last month?  YES____   NO_X__

## 2022-03-03 NOTE — PROGRESS NOTES
Covid testing to be done on 3/16/22 @ [facility]  If positive---Pt instructed to notify MD ASAP   If negative--pt was instructed to bring results DOP/DOS

## 2022-03-16 ENCOUNTER — HOSPITAL ENCOUNTER (OUTPATIENT)
Dept: PREADMISSION TESTING | Age: 67
Discharge: HOME | End: 2022-03-20
Payer: MEDICARE

## 2022-03-16 DIAGNOSIS — Z01.818 ENCOUNTER FOR PREADMISSION TESTING: ICD-10-CM

## 2022-03-16 LAB
ANION GAP SERPL CALCULATED.3IONS-SCNC: 13 MMOL/L (ref 3–16)
BUN BLDV-MCNC: 21 MG/DL (ref 7–20)
CALCIUM SERPL-MCNC: 9.2 MG/DL (ref 8.3–10.6)
CHLORIDE BLD-SCNC: 99 MMOL/L (ref 99–110)
CO2: 26 MMOL/L (ref 21–32)
CREAT SERPL-MCNC: 0.7 MG/DL (ref 0.6–1.2)
GFR AFRICAN AMERICAN: >60
GFR NON-AFRICAN AMERICAN: >60
GLUCOSE BLD-MCNC: 103 MG/DL (ref 70–99)
HCT VFR BLD CALC: 37.9 % (ref 36–48)
HEMOGLOBIN: 12.9 G/DL (ref 12–16)
MCH RBC QN AUTO: 30.7 PG (ref 26–34)
MCHC RBC AUTO-ENTMCNC: 34 G/DL (ref 31–36)
MCV RBC AUTO: 90.2 FL (ref 80–100)
PDW BLD-RTO: 13.2 % (ref 12.4–15.4)
PLATELET # BLD: 324 K/UL (ref 135–450)
PMV BLD AUTO: 8.9 FL (ref 5–10.5)
POTASSIUM SERPL-SCNC: 3.3 MMOL/L (ref 3.5–5.1)
RBC # BLD: 4.2 M/UL (ref 4–5.2)
SARS-COV-2, NAAT: NOT DETECTED
SODIUM BLD-SCNC: 138 MMOL/L (ref 136–145)
WBC # BLD: 10.5 K/UL (ref 4–11)

## 2022-03-16 PROCEDURE — 87635 SARS-COV-2 COVID-19 AMP PRB: CPT

## 2022-03-16 PROCEDURE — 85027 COMPLETE CBC AUTOMATED: CPT

## 2022-03-16 PROCEDURE — 80048 BASIC METABOLIC PNL TOTAL CA: CPT

## 2022-03-18 ENCOUNTER — ANESTHESIA EVENT (OUTPATIENT)
Dept: OPERATING ROOM | Age: 67
End: 2022-03-18
Payer: MEDICARE

## 2022-03-21 ENCOUNTER — ANESTHESIA (OUTPATIENT)
Dept: OPERATING ROOM | Age: 67
End: 2022-03-21
Payer: MEDICARE

## 2022-03-21 ENCOUNTER — HOSPITAL ENCOUNTER (OUTPATIENT)
Age: 67
Setting detail: OUTPATIENT SURGERY
Discharge: HOME | End: 2022-03-21
Attending: UROLOGY | Admitting: UROLOGY
Payer: MEDICARE

## 2022-03-21 VITALS
RESPIRATION RATE: 14 BRPM | TEMPERATURE: 97.9 F | DIASTOLIC BLOOD PRESSURE: 71 MMHG | HEART RATE: 65 BPM | OXYGEN SATURATION: 100 % | BODY MASS INDEX: 29.3 KG/M2 | HEIGHT: 63 IN | SYSTOLIC BLOOD PRESSURE: 110 MMHG | WEIGHT: 165.34 LBS

## 2022-03-21 VITALS — DIASTOLIC BLOOD PRESSURE: 66 MMHG | OXYGEN SATURATION: 99 % | SYSTOLIC BLOOD PRESSURE: 159 MMHG

## 2022-03-21 DIAGNOSIS — Z01.818 ENCOUNTER FOR PREADMISSION TESTING: Primary | ICD-10-CM

## 2022-03-21 PROCEDURE — 3700000001 HC ADD 15 MINUTES (ANESTHESIA): Performed by: UROLOGY

## 2022-03-21 PROCEDURE — 2580000003 HC RX 258: Performed by: ANESTHESIOLOGY

## 2022-03-21 PROCEDURE — 7100000001 HC PACU RECOVERY - ADDTL 15 MIN: Performed by: UROLOGY

## 2022-03-21 PROCEDURE — 2500000003 HC RX 250 WO HCPCS: Performed by: NURSE ANESTHETIST, CERTIFIED REGISTERED

## 2022-03-21 PROCEDURE — 7100000011 HC PHASE II RECOVERY - ADDTL 15 MIN: Performed by: UROLOGY

## 2022-03-21 PROCEDURE — 2580000003 HC RX 258: Performed by: UROLOGY

## 2022-03-21 PROCEDURE — 6360000002 HC RX W HCPCS: Performed by: NURSE ANESTHETIST, CERTIFIED REGISTERED

## 2022-03-21 PROCEDURE — L8606 SYNTHETIC IMPLNT URINARY 1ML: HCPCS | Performed by: UROLOGY

## 2022-03-21 PROCEDURE — 6370000000 HC RX 637 (ALT 250 FOR IP): Performed by: UROLOGY

## 2022-03-21 PROCEDURE — 3600000012 HC SURGERY LEVEL 2 ADDTL 15MIN: Performed by: UROLOGY

## 2022-03-21 PROCEDURE — 3600000002 HC SURGERY LEVEL 2 BASE: Performed by: UROLOGY

## 2022-03-21 PROCEDURE — 6360000002 HC RX W HCPCS: Performed by: UROLOGY

## 2022-03-21 PROCEDURE — 7100000000 HC PACU RECOVERY - FIRST 15 MIN: Performed by: UROLOGY

## 2022-03-21 PROCEDURE — 2709999900 HC NON-CHARGEABLE SUPPLY: Performed by: UROLOGY

## 2022-03-21 PROCEDURE — 7100000010 HC PHASE II RECOVERY - FIRST 15 MIN: Performed by: UROLOGY

## 2022-03-21 PROCEDURE — 3700000000 HC ANESTHESIA ATTENDED CARE: Performed by: UROLOGY

## 2022-03-21 DEVICE — BULKAMID URETHRAL BULKING SYSTEM 2ML
Type: IMPLANTABLE DEVICE | Site: URETER | Status: FUNCTIONAL
Brand: BULKAMID

## 2022-03-21 RX ORDER — MAGNESIUM HYDROXIDE 1200 MG/15ML
LIQUID ORAL
Status: COMPLETED | OUTPATIENT
Start: 2022-03-21 | End: 2022-03-21

## 2022-03-21 RX ORDER — PROPOFOL 10 MG/ML
INJECTION, EMULSION INTRAVENOUS CONTINUOUS PRN
Status: DISCONTINUED | OUTPATIENT
Start: 2022-03-21 | End: 2022-03-21 | Stop reason: SDUPTHER

## 2022-03-21 RX ORDER — ONDANSETRON 2 MG/ML
4 INJECTION INTRAMUSCULAR; INTRAVENOUS
Status: DISCONTINUED | OUTPATIENT
Start: 2022-03-21 | End: 2022-03-21 | Stop reason: HOSPADM

## 2022-03-21 RX ORDER — FENTANYL CITRATE 50 UG/ML
50 INJECTION, SOLUTION INTRAMUSCULAR; INTRAVENOUS EVERY 5 MIN PRN
Status: DISCONTINUED | OUTPATIENT
Start: 2022-03-21 | End: 2022-03-21 | Stop reason: HOSPADM

## 2022-03-21 RX ORDER — SODIUM CHLORIDE 0.9 % (FLUSH) 0.9 %
10 SYRINGE (ML) INJECTION EVERY 12 HOURS SCHEDULED
Status: DISCONTINUED | OUTPATIENT
Start: 2022-03-21 | End: 2022-03-21 | Stop reason: HOSPADM

## 2022-03-21 RX ORDER — SODIUM CHLORIDE 9 MG/ML
25 INJECTION, SOLUTION INTRAVENOUS PRN
Status: DISCONTINUED | OUTPATIENT
Start: 2022-03-21 | End: 2022-03-21 | Stop reason: HOSPADM

## 2022-03-21 RX ORDER — FENTANYL CITRATE 50 UG/ML
25 INJECTION, SOLUTION INTRAMUSCULAR; INTRAVENOUS EVERY 5 MIN PRN
Status: DISCONTINUED | OUTPATIENT
Start: 2022-03-21 | End: 2022-03-21 | Stop reason: HOSPADM

## 2022-03-21 RX ORDER — SODIUM CHLORIDE 9 MG/ML
INJECTION, SOLUTION INTRAVENOUS CONTINUOUS
Status: DISCONTINUED | OUTPATIENT
Start: 2022-03-21 | End: 2022-03-21 | Stop reason: HOSPADM

## 2022-03-21 RX ORDER — SODIUM CHLORIDE 0.9 % (FLUSH) 0.9 %
10 SYRINGE (ML) INJECTION PRN
Status: DISCONTINUED | OUTPATIENT
Start: 2022-03-21 | End: 2022-03-21 | Stop reason: HOSPADM

## 2022-03-21 RX ORDER — ONDANSETRON 2 MG/ML
INJECTION INTRAMUSCULAR; INTRAVENOUS PRN
Status: DISCONTINUED | OUTPATIENT
Start: 2022-03-21 | End: 2022-03-21 | Stop reason: SDUPTHER

## 2022-03-21 RX ORDER — SODIUM CHLORIDE 0.9 % (FLUSH) 0.9 %
5-40 SYRINGE (ML) INJECTION EVERY 12 HOURS SCHEDULED
Status: DISCONTINUED | OUTPATIENT
Start: 2022-03-21 | End: 2022-03-21 | Stop reason: HOSPADM

## 2022-03-21 RX ORDER — SODIUM CHLORIDE 0.9 % (FLUSH) 0.9 %
5-40 SYRINGE (ML) INJECTION PRN
Status: DISCONTINUED | OUTPATIENT
Start: 2022-03-21 | End: 2022-03-21 | Stop reason: HOSPADM

## 2022-03-21 RX ORDER — LIDOCAINE HYDROCHLORIDE 20 MG/ML
INJECTION, SOLUTION EPIDURAL; INFILTRATION; INTRACAUDAL; PERINEURAL PRN
Status: DISCONTINUED | OUTPATIENT
Start: 2022-03-21 | End: 2022-03-21 | Stop reason: SDUPTHER

## 2022-03-21 RX ORDER — MEPERIDINE HYDROCHLORIDE 25 MG/ML
12.5 INJECTION INTRAMUSCULAR; INTRAVENOUS; SUBCUTANEOUS EVERY 5 MIN PRN
Status: DISCONTINUED | OUTPATIENT
Start: 2022-03-21 | End: 2022-03-21 | Stop reason: HOSPADM

## 2022-03-21 RX ORDER — FENTANYL CITRATE 50 UG/ML
INJECTION, SOLUTION INTRAMUSCULAR; INTRAVENOUS PRN
Status: DISCONTINUED | OUTPATIENT
Start: 2022-03-21 | End: 2022-03-21 | Stop reason: SDUPTHER

## 2022-03-21 RX ORDER — OXYCODONE HYDROCHLORIDE 5 MG/1
5 TABLET ORAL PRN
Status: DISCONTINUED | OUTPATIENT
Start: 2022-03-21 | End: 2022-03-21 | Stop reason: HOSPADM

## 2022-03-21 RX ORDER — OXYCODONE HYDROCHLORIDE 10 MG/1
10 TABLET ORAL PRN
Status: DISCONTINUED | OUTPATIENT
Start: 2022-03-21 | End: 2022-03-21 | Stop reason: HOSPADM

## 2022-03-21 RX ORDER — PROPOFOL 10 MG/ML
INJECTION, EMULSION INTRAVENOUS PRN
Status: DISCONTINUED | OUTPATIENT
Start: 2022-03-21 | End: 2022-03-21 | Stop reason: SDUPTHER

## 2022-03-21 RX ADMIN — SODIUM CHLORIDE: 9 INJECTION, SOLUTION INTRAVENOUS at 11:55

## 2022-03-21 RX ADMIN — FENTANYL CITRATE 50 MCG: 50 INJECTION INTRAMUSCULAR; INTRAVENOUS at 13:28

## 2022-03-21 RX ADMIN — ONDANSETRON 4 MG: 2 INJECTION INTRAMUSCULAR; INTRAVENOUS at 13:42

## 2022-03-21 RX ADMIN — PROPOFOL 50 MG: 10 INJECTION, EMULSION INTRAVENOUS at 13:39

## 2022-03-21 RX ADMIN — PROPOFOL 30 MG: 10 INJECTION, EMULSION INTRAVENOUS at 13:46

## 2022-03-21 RX ADMIN — FENTANYL CITRATE 25 MCG: 50 INJECTION INTRAMUSCULAR; INTRAVENOUS at 13:46

## 2022-03-21 RX ADMIN — FENTANYL CITRATE 25 MCG: 50 INJECTION INTRAMUSCULAR; INTRAVENOUS at 13:39

## 2022-03-21 RX ADMIN — PROPOFOL 100 MCG/KG/MIN: 10 INJECTION, EMULSION INTRAVENOUS at 13:40

## 2022-03-21 RX ADMIN — CEFTRIAXONE 2000 MG: 1 INJECTION, POWDER, FOR SOLUTION INTRAMUSCULAR; INTRAVENOUS at 13:41

## 2022-03-21 RX ADMIN — LIDOCAINE HYDROCHLORIDE 80 MG: 20 INJECTION, SOLUTION EPIDURAL; INFILTRATION; INTRACAUDAL; PERINEURAL at 13:39

## 2022-03-21 ASSESSMENT — PULMONARY FUNCTION TESTS
PIF_VALUE: 1
PIF_VALUE: 0
PIF_VALUE: 1
PIF_VALUE: 0
PIF_VALUE: 1
PIF_VALUE: 0
PIF_VALUE: 1
PIF_VALUE: 1
PIF_VALUE: 0
PIF_VALUE: 1

## 2022-03-21 ASSESSMENT — PAIN - FUNCTIONAL ASSESSMENT: PAIN_FUNCTIONAL_ASSESSMENT: 0-10

## 2022-03-21 ASSESSMENT — PAIN SCALES - GENERAL
PAINLEVEL_OUTOF10: 0

## 2022-03-21 ASSESSMENT — ENCOUNTER SYMPTOMS: SHORTNESS OF BREATH: 0

## 2022-03-21 NOTE — ANESTHESIA POSTPROCEDURE EVALUATION
Department of Anesthesiology  Postprocedure Note    Patient: Luana Michaels  MRN: [id number]  YOB: 1955  Date of evaluation: 3/21/2022  Time:  3:11 PM     Procedure Summary     Date: 03/21/22 Room / Location: [address] [place] [address]    Anesthesia Start: 1331 Anesthesia Stop: 4759    Procedure: CYSTOSCOPY INJECTION OF URETHRAL BULKING AGENT- BULKAMID (N/A ) Diagnosis:       Urgency of urination      (URGENCY OF URINATION)    Surgeons: Stacy Botello MD Responsible Provider: Braxton Turner MD    Anesthesia Type: MAC ASA Status: 2          Anesthesia Type: MAC    Fredy Phase I: Fredy Score: 9    Fredy Phase II: Fredy Score: 10    Last vitals: Reviewed and per EMR flowsheets.        Anesthesia Post Evaluation    Patient location during evaluation: PACU  Patient participation: complete - patient participated  Level of consciousness: awake and alert  Pain score: 0  Airway patency: patent  Nausea & Vomiting: no nausea and no vomiting  Complications: no  Cardiovascular status: blood pressure returned to baseline  Respiratory status: acceptable  Hydration status: euvolemic

## 2022-03-21 NOTE — BRIEF OP NOTE
Brief Postoperative Note      Patient: Alvy Schirmer  YOB: 1955  MRN: [id number]    Date of Procedure: 3/21/2022    Pre-Op Diagnosis: stress incontinence, intrinsic sphincter deficiency    Post-Op Diagnosis: Same       Procedure(s):  CYSTOSCOPY INJECTION OF URETHRAL BULKING AGENT- BULKAMID    Surgeon(s):  Gilson White MD    Assistant:  Surgical Assistant: Lucinda Crandall RN    Anesthesia: Monitor Anesthesia Care    Estimated Blood Loss (mL): Minimal    Complications: None    Specimens:   * No specimens in log *    Implants:  Implant Name Type Inv.  Item Serial No.  Lot No. LRB No. Used Action   SYSTEM BULKING PROC BULKAMID URETHRAL - [id number]  SYSTEM BULKING PROC BULKAMID URETHRAL  [employer] [id number] N/A 1 Implanted         Drains: * No LDAs found *    Findings: normal bladder    Electronically signed by Gilson White MD on 3/21/2022 at 2:02 PM

## 2022-03-21 NOTE — INTERVAL H&P NOTE
Update History & Physical    The patient's History and Physical was reviewed with the patient and I examined the patient. There was no change. The surgical site was confirmed by the patient and me. Plan: The risks, benefits, expected outcome, and alternative to the recommended procedure have been discussed with the patient. Patient understands and wants to proceed with the procedure.      Electronically signed by Arie Wilcox MD on 3/21/2022 at 1:08 PM

## 2022-03-21 NOTE — ANESTHESIA PRE PROCEDURE
Department of Anesthesiology  Preprocedure Note       Name:  Valerie Akins   Age:  78 y.o.  :  1955                                          MRN:  [id number]         Date:  3/21/2022      Surgeon: Eleazar Bach):  Chauncey Whiteside MD    Procedure: Procedure(s):  CYSTOSCOPY INJECTION OF URETHRAL BULKING AGENT- BULKAMID    Medications prior to admission:   Prior to Admission medications    Medication Sig Start Date End Date Taking? Authorizing Provider   potassium chloride (KLOR-CON) 20 MEQ packet Take 20 mEq by mouth daily   Yes Historical Provider, MD   Cholecalciferol (VITAMIN D3) 50 MCG (2000) CAPS Take by mouth daily   Yes Historical Provider, MD   Meloxicam 15 MG TBDP Take by mouth as needed   Yes Historical Provider, MD   Fluticasone Propionate (FLONASE NA) by Nasal route as needed   Yes Historical Provider, MD   loratadine (CLARITIN) 10 MG tablet Take 10 mg by mouth daily as needed   Yes Historical Provider, MD   estradiol (ESTRACE) 1 MG tablet TAKE ONE TABLET BY MOUTH DAILY 20  Yes Conrad Brown MD   chlorthalidone (HYGROTON) 25 MG tablet Take 25 mg by mouth daily 3/2/20  Yes Historical Provider, MD   omeprazole (PRILOSEC) 40 MG capsule Take 40 mg by mouth daily  14  Yes Historical Provider, MD       Current medications:    Current Facility-Administered Medications   Medication Dose Route Frequency Provider Last Rate Last Admin    cefTRIAXone (ROCEPHIN) 1000 mg IVPB in 50 mL D5W minibag  1,000 mg IntraVENous Once Chauncey Whiteside MD        0.9 % sodium chloride infusion   IntraVENous Continuous Ethel Gonzalez  mL/hr at 22 1155 New Bag at 22 1155    sodium chloride flush 0.9 % injection 10 mL  10 mL IntraVENous 2 times per day Ethel Gonzalez MD        sodium chloride flush 0.9 % injection 10 mL  10 mL IntraVENous PRN Ethel Gonzalez MD        0.9 % sodium chloride infusion  25 mL IntraVENous PRN Ethel Gonzalez MD           Allergies:     Allergies Allergen Reactions    Latex Hives, Itching and Rash     \"around the site\"      Codeine Hives    Ezetimibe Other (See Comments)     Muscle pain     Pravastatin Itching    Tetanus Toxoids Swelling     Swelling at injection site       Problem List:    Patient Active Problem List   Diagnosis Code    Pelvic pain in female R10.2    Vaginal dryness N89.8    Menopause Z78.0    Right lumbar radiculitis M54.16    DDD (degenerative disc disease), lumbar M51.36    Thoracic spine pain M54.6    Lumbar spine pain M54.50       Past Medical History:        Diagnosis Date    DDD (degenerative disc disease), lumbar 02/05/2019    Thoracic spine pain    Hemorrhoids, external     HIGH CHOLESTEROL     Hypertension     IBS (irritable bowel syndrome)     Right lumbar radiculitis 02/05/2019    Vaginal wall prolapse     Wears glasses        Past Surgical History:        Procedure Laterality Date    CERVICAL FUSION  2009    DILATION AND CURETTAGE OF UTERUS      HYSTERECTOMY      prolapsed uterus, still with ovaries    NERVE BLOCK LUMB FACET LEVEL 1 BILATERAL Right 3/11/2019    RIGHT L4/L5 FACET CYST ASPIRATE WITH FLUOROSCOPY (90546) performed by Jesse George MD at [address] Right 12/16/2019    RIGHT L4/5 LUMBAR FACET CYST ASPIRATION WITH FLUOROSCOPY performed by Jesse George MD at [address] [place] [address] N/A 3/11/2020    CAUDAL LUMBAR EPIDURAL STEROID INJECTION performed by Jesse George MD at [address] [place] [address] N/A 11/4/2020    CAUDAL LUMBAR EPIDURAL STEROID INJECTION WITH FLUOROSCOPY performed by Jesse George MD at [address] [place] [address] N/A 11/4/2020    AND COCCYGEAL JOINT INJECTION WITH FLUOROSCOPY performed by Jesse George MD at [address] [place] [address] Right 12/28/2020    RIGHT L4 AND L5 TRANSFORAMINAL EPIDURAL STEROID INJECTION WITH FLUOROSCOPY performed by Jesse George MD at [facility]  2006 Social History:    Social History     Tobacco Use    Smoking status: Never Smoker    Smokeless tobacco: Never Used   Substance Use Topics    Alcohol use: Yes     Alcohol/week: 0.0 standard drinks     Comment: Occasions                                Counseling given: Not Answered      Vital Signs (Current):   Vitals:    03/03/22 1100 03/21/22 1141 03/21/22 1148   BP:   138/79   Pulse:   70   Resp:   15   Temp:   97.4 °F (36.3 °C)   TempSrc:   Temporal   SpO2:   97%   Weight: 166 lb (75.3 kg) 165 lb 5.5 oz (75 kg)    Height: 5' 3\" (1.6 m) 5' 3\" (1.6 m)                                               BP Readings from Last 3 Encounters:   03/21/22 138/79   04/22/21 122/70   12/28/20 121/81       NPO Status: Time of last liquid consumption: 2200                        Time of last solid consumption: 1800                        Date of last liquid consumption: 03/20/22                        Date of last solid food consumption: 03/20/22    BMI:   Wt Readings from Last 3 Encounters:   03/21/22 165 lb 5.5 oz (75 kg)   04/22/21 170 lb 4 oz (77.2 kg)   01/13/21 162 lb 14.7 oz (73.9 kg)     Body mass index is 29.29 kg/m². CBC:   Lab Results   Component Value Date    WBC 10.5 03/16/2022    RBC 4.20 03/16/2022    HGB 12.9 03/16/2022    HCT 37.9 03/16/2022    MCV 90.2 03/16/2022    RDW 13.2 03/16/2022     03/16/2022       CMP:   Lab Results   Component Value Date     03/16/2022    K 3.3 03/16/2022    CL 99 03/16/2022    CO2 26 03/16/2022    BUN 21 03/16/2022    CREATININE 0.7 03/16/2022    GFRAA >60 03/16/2022    LABGLOM >60 03/16/2022    GLUCOSE 103 03/16/2022    PROT 7.1 08/26/2015    CALCIUM 9.2 03/16/2022    BILITOT 0.6 08/26/2015    ALKPHOS 75 08/26/2015    AST 23 08/26/2015    ALT 21 08/26/2015       POC Tests: No results for input(s): POCGLU, POCNA, POCK, POCCL, POCBUN, POCHEMO, POCHCT in the last 72 hours.     Coags: No results found for: PROTIME, INR, APTT    HCG (If Applicable): No results found for: PREGTESTUR, PREGSERUM, HCG, HCGQUANT     ABGs: No results found for: PHART, PO2ART, MKV8CPE, CAK5FSJ, BEART, G1OZRWCU     Type & Screen (If Applicable):  No results found for: LABABO, LABRH    Drug/Infectious Status (If Applicable):  No results found for: HIV, HEPCAB    COVID-19 Screening (If Applicable):   Lab Results   Component Value Date    COVID19 Not Detected 03/16/2022    COVID19 Not Detected 12/23/2020    COVID19 Not Detected 10/30/2020           Anesthesia Evaluation  Patient summary reviewed and Nursing notes reviewed no history of anesthetic complications:   Airway: Mallampati: II  TM distance: >3 FB   Neck ROM: full  Mouth opening: > = 3 FB Dental: normal exam         Pulmonary:       (-) pneumonia, COPD, asthma, shortness of breath, recent URI and sleep apnea                           Cardiovascular:    (+) hypertension:,     (-) pacemaker, valvular problems/murmurs, past MI, CAD, CABG/stent, dysrhythmias,  angina,  CHF, orthopnea, PND,  DARBY, no pulmonary hypertension and no hyperlipidemia      Rhythm: regular                      Neuro/Psych:               GI/Hepatic/Renal:        (-) hiatal hernia, GERD, PUD, hepatitis, liver disease, no renal disease, bowel prep and no morbid obesity       Endo/Other:    (+) no malignancy/cancer. (-) diabetes mellitus, hypothyroidism, hyperthyroidism, blood dyscrasia, arthritis, no electrolyte abnormalities, no malignancy/cancer               Abdominal:             Vascular: Other Findings:             Anesthesia Plan      MAC     ASA 2       Induction: intravenous. MIPS: Prophylactic antiemetics administered. Anesthetic plan and risks discussed with patient and spouse. Plan discussed with CRNA. Rakesh Dick MD   3/21/2022        This pre-anesthesia assessment may be used as a history and physical.    DOS STAFF ADDENDUM:    Pt seen and examined, chart reviewed (including anesthesia, drug and allergy history).   No interval changes to history and physical examination. Anesthetic plan, risks, benefits, alternatives, and personnel involved discussed with patient. Patient verbalized an understanding and agrees to proceed.       Lu Wilcox MD  March 21, 2022  12:20 PM

## 2022-03-22 NOTE — OP NOTE
[address]           [address] Ak[address] [place] [address]                                OPERATIVE REPORT    PATIENT NAME: Erlinda Sue                   :        1955  MED REC NO:   [id number]                          ROOM:  ACCOUNT NO:   [de-identified]                           ADMIT DATE: 2022  PROVIDER:     Vivi Chamberlain MD      DATE OF PROCEDURE:  2022    PREOPERATIVE DIAGNOSIS:  Stress urinary incontinence due to intrinsic  sphincter deficiency. POSTOPERATIVE DIAGNOSIS:  Stress urinary incontinence due to intrinsic  sphincter deficiency. PROCEDURE PERFORMED:  Cystoscopy, injection of urethral bulking agent  using Bulkamid. SURGEON:  Vivi Chamberlain MD    ANESTHESIA:  MAC.    COMPLICATIONS:  None. BLOOD LOSS:  Minimal.    INDICATION:  A 48-year-old female with stress incontinence. Her exam is  consistent with intrinsic sphincter deficiency. She is here for bulking  injection. PROCEDURE:  She was given Rocephin. She was taken to the cystoscopy  suite. She moved onto the table. Anesthesia was induced. Her position  was changed to the lithotomy position. Her genitalia were prepped and  draped. The 21-Bahraini scope advanced easily through the urethra into  the bladder. The bladder was carefully evaluated. No abnormalities  were identified. Both ureteral orifices were normal.  The bladder was  drained and then the Bulkamid scope was inserted. The scope was  withdrawn to the level of the mid urethra, 0.5 mL of Bulkamid was  injected submucosally at the 7 o'clock position followed by 0.5 mL at  the 5 o'clock position and 0.5 mL at the 1 o'clock position and 0.5 mL  at the 11 o'clock position. This resulted in excellent coaptation. The  scope was withdrawn. A 14-Bahraini catheter was used to drain the  bladder. Then the catheter was withdrawn. She was awakened and  transferred to recovery.   She will have a voiding trial in the recovery  room and follow up in the office in 2 weeks.         Zaida Fuentes MD    D: 03/21/2022 14:15:20       T: 03/21/2022 14:18:55     [email or web address]  Job#: [id number]     Doc#: [id number]    CC:

## 2022-05-23 DIAGNOSIS — Z78.0 MENOPAUSE: ICD-10-CM

## 2022-05-23 RX ORDER — ESTRADIOL 1 MG/1
TABLET ORAL
Qty: 90 TABLET | Refills: 0 | Status: SHIPPED | OUTPATIENT
Start: 2022-05-23 | End: 2022-08-27

## 2022-06-14 ENCOUNTER — HOSPITAL ENCOUNTER (OUTPATIENT)
Dept: WOMENS IMAGING | Age: 67
Discharge: HOME | End: 2022-06-14
Payer: MEDICARE

## 2022-06-14 ENCOUNTER — HOSPITAL ENCOUNTER (OUTPATIENT)
Dept: ULTRASOUND IMAGING | Age: 67
Discharge: HOME | End: 2022-06-14
Payer: MEDICARE

## 2022-06-14 DIAGNOSIS — R92.2 BREAST DENSITY: ICD-10-CM

## 2022-06-14 DIAGNOSIS — R92.8 ABNORMAL MAMMOGRAM: Primary | ICD-10-CM

## 2022-06-14 PROCEDURE — 76642 ULTRASOUND BREAST LIMITED: CPT

## 2022-06-14 PROCEDURE — G0279 TOMOSYNTHESIS, MAMMO: HCPCS

## 2022-06-20 ENCOUNTER — HOSPITAL ENCOUNTER (OUTPATIENT)
Dept: WOMENS IMAGING | Age: 67
Discharge: HOME | End: 2022-06-20
Admitting: OBSTETRICS & GYNECOLOGY
Payer: MEDICARE

## 2022-06-20 VITALS — HEART RATE: 66 BPM | DIASTOLIC BLOOD PRESSURE: 69 MMHG | OXYGEN SATURATION: 95 % | SYSTOLIC BLOOD PRESSURE: 142 MMHG

## 2022-06-20 DIAGNOSIS — R92.8 ABNORMAL MAMMOGRAM: ICD-10-CM

## 2022-06-20 PROCEDURE — 88341 IMHCHEM/IMCYTCHM EA ADD ANTB: CPT

## 2022-06-20 PROCEDURE — 88342 IMHCHEM/IMCYTCHM 1ST ANTB: CPT

## 2022-06-20 PROCEDURE — 77065 DX MAMMO INCL CAD UNI: CPT

## 2022-06-20 PROCEDURE — 19081 BX BREAST 1ST LESION STRTCTC: CPT

## 2022-06-20 PROCEDURE — 88305 TISSUE EXAM BY PATHOLOGIST: CPT

## 2022-06-20 PROCEDURE — 76098 X-RAY EXAM SURGICAL SPECIMEN: CPT

## 2022-06-20 ASSESSMENT — PAIN SCALES - GENERAL: PAINLEVEL_OUTOF10: 0

## 2022-06-20 NOTE — PROGRESS NOTES
Breast Biopsy Nursing Note    NAME:  Linnea Plascencia OF BIRTH:  1955 GENDER: female  MEDICAL RECORD NUMBER:  [id number]  DATE:  6/20/2022     Breast Biopsy completed by .  Expiration date site marker checked immediately prior to use.  Package intact prior to use and no damage noted.  Site marker was removed from protective sterile packaging by physician.  Site marker was placed by physician into left     breast/s.  Hemostasis achieved via site pressure; Biopsy site cleansed with alcohol   Steri-strips applied along with small amount of bacitracin-neomycin polymixin then Coverderm    Breast Biopsy tissue was placed in proper container/s and labeled.  Breast Biopsy tissue was taken to Pathology for evaluation. Procedural Pain:  0  / 10     Post Procedural Pain:  0 / 10     Procedure well tolerated. Pt stable and alert and oriented x 3 upon discharge. Ice pack placed over biopsy site. Pt given post-biopsy education and all questions answered. Pt has NN contact info.        Electronically signed by Amelia Vallecillo RN on 6/20/2022 at 9:59 AM

## 2022-06-23 ENCOUNTER — CASE MANAGEMENT (OUTPATIENT)
Dept: WOMENS IMAGING | Age: 67
End: 2022-06-23

## 2022-06-23 NOTE — PROGRESS NOTES
Subjective:      Patient ID: Luis Perez is a 78 y.o. female.     HPI    Review of Systems    Objective:   Physical Exam    Assessment:      ***      Plan:      ***        Krunal Fan RN

## 2022-06-23 NOTE — PROGRESS NOTES
Pt informed of high risk pathology results showing Radial Scar. Pt knows Dr. Phyllis Marie office will call to schedule a consult. Pt verbalized understanding and all questions were answered.

## 2022-07-12 ENCOUNTER — TELEPHONE (OUTPATIENT)
Dept: BREAST CENTER | Age: 67
End: 2022-07-12

## 2022-07-12 NOTE — TELEPHONE ENCOUNTER
Left voicemail to cancel patients 7/13/22 appointment and requested her to call back  Looking to add patient on 7/19/22 at 12:30   Thank you.

## 2022-07-19 ENCOUNTER — TELEPHONE (OUTPATIENT)
Dept: BREAST CENTER | Age: 67
End: 2022-07-19

## 2022-07-19 ENCOUNTER — OFFICE VISIT (OUTPATIENT)
Dept: BREAST CENTER | Age: 67
End: 2022-07-19
Payer: MEDICARE

## 2022-07-19 VITALS
HEIGHT: 63 IN | SYSTOLIC BLOOD PRESSURE: 126 MMHG | DIASTOLIC BLOOD PRESSURE: 80 MMHG | BODY MASS INDEX: 28.35 KG/M2 | HEART RATE: 71 BPM | OXYGEN SATURATION: 98 % | WEIGHT: 160 LBS

## 2022-07-19 DIAGNOSIS — N63.20 LEFT BREAST MASS: Primary | ICD-10-CM

## 2022-07-19 PROCEDURE — 99205 OFFICE O/P NEW HI 60 MIN: CPT | Performed by: SURGERY

## 2022-07-19 PROCEDURE — 1123F ACP DISCUSS/DSCN MKR DOCD: CPT | Performed by: SURGERY

## 2022-07-19 NOTE — PROGRESS NOTES
Subjective:      Patient ID: Sari Vasquez is a 78 y.o. female. HPI   Chief Complaint   Patient presents with    Surgical Consult     New patient- Radial scar      Patient presented for a 6 month follow up left mammogram follow up of a left breast asymmetry and a complex cyst. Imaging showed a suspicious area of architectural distortion 2:00 left breast. Stereotactic biopsy showed a complex sclerosing lesion (radial scar). No atypia. Patient has not felt any breast masses, no breast pain or nipple discharge. Breast History:  History of Previous Breast Biopsy:yes  Self Breast Exams Completed:yes-yes  Family History of Breast Cancer:yes-yes M  aunt breast cancer dx early 19\"s decd not from [facility]   Family History of Other Cancers:yes- MGM uterine,kidney, lung, brain over 21 years. Mother lung cancer dx  age 64, dec'd age 62.  M Aunt colon cancer, dec'd , M uncle melanoma , M aunt colon cancer  Ashkenazi Sikhism Decent:no  Bra Size: 36 C     Gyne History:  : 2,   Para: 2             Age of Menarche: 5 years  Age of Menopause:  unsure  Age of first live Birth: 20 years  History of Hysterectomy / SUKHDEEP-BSO: 27  History of OCP's 6 monthsHRT:15 years Estradial Estradial  When and how long:    Family History or personal history of Ovarian Cancer: no        Past Medical History:   Diagnosis Date    DDD (degenerative disc disease), lumbar 2019    Thoracic spine pain    Hemorrhoids, external     HIGH CHOLESTEROL     Hypertension     IBS (irritable bowel syndrome)     Right lumbar radiculitis 2019    Vaginal wall prolapse     Wears glasses        Past Surgical History:   Procedure Laterality Date    CERVICAL FUSION      CYSTOSCOPY N/A 3/21/2022    CYSTOSCOPY INJECTION OF URETHRAL BULKING AGENT- BULKAMID performed by Javier Palafox MD at [address] (CERVIX STATUS UNKNOWN)      prolapsed uterus, still with ovaries    [facility] LOC BREAST BIOPSY LEFT Left 6/20/2022    DAKOTA STEROTACTIC LOC BREAST BIOPSY LEFT 6/20/2022 [facility] [address] LUMB FACET LEVEL 1 BILATERAL Right 3/11/2019    RIGHT L4/L5 FACET CYST ASPIRATE WITH FLUOROSCOPY (20045) performed by Arie Olson MD at [address] Right 12/16/2019    RIGHT L4/5 LUMBAR FACET CYST ASPIRATION WITH FLUOROSCOPY performed by Arie Olson MD at [address] [facility] N/A 3/11/2020    CAUDAL LUMBAR EPIDURAL STEROID INJECTION performed by Arie Olson MD at [address] [facility] N/A 11/4/2020    CAUDAL LUMBAR EPIDURAL STEROID INJECTION WITH FLUOROSCOPY performed by Arie Olson MD at [address] [facility] N/A 11/4/2020    AND COCCYGEAL JOINT INJECTION WITH FLUOROSCOPY performed by Arie Olson MD at [address] [facility] Right 12/28/2020    RIGHT L4 AND L5 TRANSFORAMINAL EPIDURAL STEROID INJECTION WITH FLUOROSCOPY performed by Arie Olson MD at [address] [facility]  2006       Current Outpatient Medications   Medication Sig Dispense Refill    estradiol (ESTRACE) 1 MG tablet TAKE ONE TABLET BY MOUTH DAILY 90 tablet 0    potassium chloride (KLOR-CON) 20 MEQ packet Take 20 mEq by mouth daily      Cholecalciferol (VITAMIN D3) 50 MCG (2000 UT) CAPS Take by mouth daily      Meloxicam 15 MG TBDP Take by mouth as needed      Fluticasone Propionate (FLONASE NA) by Nasal route as needed      loratadine (CLARITIN) 10 MG tablet Take 10 mg by mouth daily as needed      chlorthalidone (HYGROTON) 25 MG tablet Take 25 mg by mouth daily      omeprazole (PRILOSEC) 40 MG capsule Take 40 mg by mouth daily        No current facility-administered medications for this visit.        Social History     Socioeconomic History    Marital status:      Spouse name: Not on file    Number of children: Not on file    Years of education: Not on file    Highest education level: Not on file   Occupational History    Not on file   Tobacco Use    Smoking status: Never    Smokeless tobacco: Never   Vaping Use    Vaping Use: Never used   Substance and Sexual Activity    Alcohol use: Yes     Alcohol/week: 0.0 standard drinks     Comment: Occasions    Drug use: Never    Sexual activity: Yes     Partners: Male   Other Topics Concern    Not on file   Social History Narrative    Not on file     Social Determinants of Health     Financial Resource Strain: Not on file   Food Insecurity: Not on file   Transportation Needs: Not on file   Physical Activity: Not on file   Stress: Not on file   Social Connections: Not on file   Intimate Partner Violence: Not on file   Housing Stability: Not on file       Objective:   Physical Exam    Bilateral breasts - Normal contour, no masses, no nipple or skin changes. No cervical or axillary adenopathy. Assessment:      Diagnosis Orders   1. Left breast mass               Plan:     I reviewed her imaging with radiology. We discussed management of radial scars and the small risk of upgrade to a possible cancer. I recommend we proceed with RFID localized left breast excisional biopsy of this high risk lesion. The indications for the planned procedure, along with the potential benefits and risks which include but are not limited to the risk of anesthesia, bleeding, infection, possible failed operation, possible need for additional surgery pending final pathologic assessment, nipple loss, lymphedema, sensation changes, nerve injury, persistent pain, and unappealing cosmetics were reviewed. The discussion I have done encompasses risks, benefits, and side effects related to the alternatives and the risks related to not receiving the proposed care, treatment, and services. All questions were answered and she agrees to proceed.      On this date 07/19/22 I have spent 60 minutes reviewing previous notes, test results, and face to face with the patient discussing the diagnosis and importance of compliance with the treatment plan as well as documenting on the day of the visit.      Electronically signed by Vlad Dc MD on 7/19/2022 at 1:54 PM

## 2022-07-19 NOTE — TELEPHONE ENCOUNTER
Breast History:  History of Previous Breast Biopsy:yes  Self Breast Exams Completed:yes-yes  Family History of Breast Cancer:yes-yes M  aunt breast cancer dx early 19\"s dec'd not from [facility]   Family History of Other Cancers:yes- MGM uterine,kidney, lung, brain over 21 years. Mother lung cancer dx  age 64, dec'd age 62.  M Aunt colon cancer, dec'd , M uncle melanoma , M aunt colon cancer  Ashkenazi Mormon Decent:no  Bra Size: 36 C    Gyne History:  : 2,   Para: 2   Age of Menarche: 5 years  Age of Menopause:  unsure  Age of first live Birth: 20 years  History of Hysterectomy / SUKHDEEP-BSO: 27  History of OCP's 6 monthsHRT:15 years Estradial Estradial  When and how long:    Family History or personal history of Ovarian Cancer: no

## 2022-07-20 ENCOUNTER — TELEPHONE (OUTPATIENT)
Dept: BREAST CENTER | Age: 67
End: 2022-07-20

## 2022-07-20 DIAGNOSIS — N63.20 LEFT BREAST MASS: Primary | ICD-10-CM

## 2022-07-20 NOTE — TELEPHONE ENCOUNTER
Spoke to patient earlier today. She is scheduled for RFID loc lt [facility] 8/4/22 @1130 arrival time 1000. Discussed H&P  emailed patient forms and post op instructions. I have patient scheduled for her post op 8/22/22 @ 2:15pm. Please verify that is a good time for her. Patient will need to stop meloxicam 5 days prior to surgery. Email sent to NN to schedule for tag placement .        Thanks, Krunal Artist

## 2022-07-25 ENCOUNTER — CASE MANAGEMENT (OUTPATIENT)
Dept: WOMENS IMAGING | Age: 67
End: 2022-07-25

## 2022-07-25 NOTE — PROGRESS NOTES
C-diff Questionnaire:     * Admitted with diarrhea? [] YES    [x]  NO     *Prior history of C-Diff. In last 3 months? [] YES    [x]  NO     *Antibiotic use in the past 6-8 weeks? [x]  NO    []  YES      If yes, which: REASON_________________     *Prior hospitalization or nursing home in the last month? []  YES    [x]  NO     [facility]. .call before you fall    [address] time_10     Surgery time 1130    Do not eat or drink anything after 12:00 midnight prior to your surgery. This includes water chewing gum, mints and ice chips- the Day of Surgery. You may brush your teeth and gargle the morning of your surgery, but do not swallow the water     Please see your family doctor/pediatrician for a history and physical and/or questions concerning medications. Bring any test results/reports from your physicians office. If you are under the care of a heart doctor or specialist doctor, please be aware that you may be asked to them for clearance    You may be asked to stop blood thinners such as Coumadin, Plavix, Fragmin, Lovenox, etc., or any anti-inflammatories such as:  Aspirin, Ibuprofen, Advil, Naproxen prior to your surgery. We also ask that you stop any OTC medications such as fish oil, vitamin E, glucosamine, garlic, Multivitamins, COQ 10, etc.    We ask that you do not smoke 24 hours prior to surgery  We ask that you do not  drink any alcoholic beverages 24 hours prior to surgery     You must make arrangements for a responsible adult to take you home after your surgery. For your safety you will not be allowed to leave alone or drive yourself home. Your surgery will be cancelled if you do not have a ride home. Also for your safety, it is strongly suggested that someone stay with you the first 24 hours after your surgery.      A parent or legal guardian must accompany a child scheduled for surgery and plan to stay at the hospital until the child is discharged. Please do not bring other children with you. For your comfort, please wear simple loose fitting clothing to the hospital.  Please do not bring valuables. Do not wear any make-up or nail polish on your fingers or toes. For your safety, please do not wear any jewelry or body piercing's on the day of surgery. All jewelry must be removed. If you have dentures, they will be removed before going to operating room. For your convenience, we will provide you with a container. If you wear contact lenses or glasses, they will be removed, please bring a case for them. If you have a living will and a durable power of  for healthcare, please bring in a copy. As part of our patient safety program to minimize surgical site infections, we ask you to do the following:    Please notify your surgeon if you develop any illness between         now and the day of your surgery. This includes a cough, cold, fever, sore throat, nausea,         or vomiting, and diarrhea, etc.   Please notify your surgeon if you experience dizziness, shortness         of breath or blurred vision between now and the time of your surgery. Do not shave your operative site 96 hours prior to surgery. For face and neck surgery, men may use an electric razor 48 hours   prior to surgery. You may shower the night before surgery or the morning of   your surgery with an antibacterial soap. You will need to bring a photo ID and insurance card     If you use a C-pap or Bi-pap machine, please bring your machine with you to the hospital     Our goal is to provide you with excellent care, therefore, visitors will be limited to so that we may focus on providing this care for you. Please contact your surgeon office, if you have any further questions.                  [facility] phone number:  [address] PAT fax number:  [phone]    Please note these are generalized instructions for all surgical cases, you may be provided with more specific instructions according to your surgery.

## 2022-07-25 NOTE — PROGRESS NOTES
DOS 2022   1955    Pt will have medical clear. 2022- @ [address]      UPDATE: 22 Pt. Saw Dr. Robert Marlow on 22 for H&P. May proceed with surgery.

## 2022-08-01 ENCOUNTER — HOSPITAL ENCOUNTER (OUTPATIENT)
Dept: WOMENS IMAGING | Age: 67
Discharge: HOME | End: 2022-08-01
Payer: MEDICARE

## 2022-08-01 VITALS — SYSTOLIC BLOOD PRESSURE: 137 MMHG | OXYGEN SATURATION: 97 % | HEART RATE: 74 BPM | DIASTOLIC BLOOD PRESSURE: 76 MMHG

## 2022-08-01 DIAGNOSIS — N63.20 LEFT BREAST MASS: ICD-10-CM

## 2022-08-01 PROCEDURE — 19281 PERQ DEVICE BREAST 1ST IMAG: CPT

## 2022-08-01 ASSESSMENT — PAIN SCALES - GENERAL
PAINLEVEL_OUTOF10: 0
PAINLEVEL_OUTOF10: 0

## 2022-08-01 NOTE — PROGRESS NOTES
NAME:  Haley Reyes  YOB: 1955  MEDICAL RECORD NUMBER:  [id number]  EPISODE DATE:  8/1/2022                             Expiration date of localization device was verified . Packaging intact with no observable damage. TAG Localization performed by Dr. Chad Shah for left breast site. Site dressed with  Bandaid . Patient tolerated procedure well; alert and oriented x3. Patient discharged to home.        Electronically signed by Darian Morales RN on 8/1/2022 at 9:57 AM

## 2022-08-03 ENCOUNTER — ANESTHESIA EVENT (OUTPATIENT)
Dept: OPERATING ROOM | Age: 67
End: 2022-08-03
Payer: MEDICARE

## 2022-08-04 ENCOUNTER — HOSPITAL ENCOUNTER (OUTPATIENT)
Age: 67
Setting detail: OUTPATIENT SURGERY
Discharge: HOME | End: 2022-08-04
Attending: SURGERY | Admitting: SURGERY
Payer: MEDICARE

## 2022-08-04 ENCOUNTER — HOSPITAL ENCOUNTER (OUTPATIENT)
Dept: WOMENS IMAGING | Age: 67
Discharge: HOME | End: 2022-08-04
Attending: SURGERY
Payer: MEDICARE

## 2022-08-04 ENCOUNTER — ANESTHESIA (OUTPATIENT)
Dept: OPERATING ROOM | Age: 67
End: 2022-08-04
Payer: MEDICARE

## 2022-08-04 VITALS
OXYGEN SATURATION: 99 % | DIASTOLIC BLOOD PRESSURE: 69 MMHG | BODY MASS INDEX: 28.32 KG/M2 | HEIGHT: 63 IN | WEIGHT: 159.83 LBS | TEMPERATURE: 97.4 F | RESPIRATION RATE: 16 BRPM | SYSTOLIC BLOOD PRESSURE: 121 MMHG | HEART RATE: 58 BPM

## 2022-08-04 DIAGNOSIS — N63.20 LEFT BREAST MASS: ICD-10-CM

## 2022-08-04 DIAGNOSIS — R92.8 ABNORMAL MAMMOGRAM: ICD-10-CM

## 2022-08-04 PROCEDURE — 3700000001 HC ADD 15 MINUTES (ANESTHESIA): Performed by: SURGERY

## 2022-08-04 PROCEDURE — 7100000001 HC PACU RECOVERY - ADDTL 15 MIN: Performed by: SURGERY

## 2022-08-04 PROCEDURE — 7100000000 HC PACU RECOVERY - FIRST 15 MIN: Performed by: SURGERY

## 2022-08-04 PROCEDURE — 3600000012 HC SURGERY LEVEL 2 ADDTL 15MIN: Performed by: SURGERY

## 2022-08-04 PROCEDURE — 88305 TISSUE EXAM BY PATHOLOGIST: CPT

## 2022-08-04 PROCEDURE — 7100000010 HC PHASE II RECOVERY - FIRST 15 MIN: Performed by: SURGERY

## 2022-08-04 PROCEDURE — 88341 IMHCHEM/IMCYTCHM EA ADD ANTB: CPT

## 2022-08-04 PROCEDURE — 88342 IMHCHEM/IMCYTCHM 1ST ANTB: CPT

## 2022-08-04 PROCEDURE — 19125 EXCISION BREAST LESION: CPT | Performed by: SURGERY

## 2022-08-04 PROCEDURE — 2500000003 HC RX 250 WO HCPCS

## 2022-08-04 PROCEDURE — 2580000003 HC RX 258: Performed by: SURGERY

## 2022-08-04 PROCEDURE — 2500000003 HC RX 250 WO HCPCS: Performed by: SURGERY

## 2022-08-04 PROCEDURE — 3700000000 HC ANESTHESIA ATTENDED CARE: Performed by: SURGERY

## 2022-08-04 PROCEDURE — 6360000002 HC RX W HCPCS

## 2022-08-04 PROCEDURE — 2580000003 HC RX 258: Performed by: ANESTHESIOLOGY

## 2022-08-04 PROCEDURE — 6370000000 HC RX 637 (ALT 250 FOR IP): Performed by: SURGERY

## 2022-08-04 PROCEDURE — 6360000002 HC RX W HCPCS: Performed by: SURGERY

## 2022-08-04 PROCEDURE — 3600000002 HC SURGERY LEVEL 2 BASE: Performed by: SURGERY

## 2022-08-04 PROCEDURE — 7100000011 HC PHASE II RECOVERY - ADDTL 15 MIN: Performed by: SURGERY

## 2022-08-04 PROCEDURE — 76098 X-RAY EXAM SURGICAL SPECIMEN: CPT

## 2022-08-04 PROCEDURE — 2709999900 HC NON-CHARGEABLE SUPPLY: Performed by: SURGERY

## 2022-08-04 PROCEDURE — A4217 STERILE WATER/SALINE, 500 ML: HCPCS | Performed by: SURGERY

## 2022-08-04 RX ORDER — FENTANYL CITRATE 50 UG/ML
INJECTION, SOLUTION INTRAMUSCULAR; INTRAVENOUS PRN
Status: DISCONTINUED | OUTPATIENT
Start: 2022-08-04 | End: 2022-08-04 | Stop reason: SDUPTHER

## 2022-08-04 RX ORDER — IPRATROPIUM BROMIDE AND ALBUTEROL SULFATE 2.5; .5 MG/3ML; MG/3ML
1 SOLUTION RESPIRATORY (INHALATION)
Status: DISCONTINUED | OUTPATIENT
Start: 2022-08-04 | End: 2022-08-04 | Stop reason: HOSPADM

## 2022-08-04 RX ORDER — LIDOCAINE HYDROCHLORIDE 20 MG/ML
INJECTION, SOLUTION EPIDURAL; INFILTRATION; INTRACAUDAL; PERINEURAL PRN
Status: DISCONTINUED | OUTPATIENT
Start: 2022-08-04 | End: 2022-08-04 | Stop reason: SDUPTHER

## 2022-08-04 RX ORDER — MAGNESIUM HYDROXIDE 1200 MG/15ML
LIQUID ORAL CONTINUOUS PRN
Status: COMPLETED | OUTPATIENT
Start: 2022-08-04 | End: 2022-08-04

## 2022-08-04 RX ORDER — LABETALOL HYDROCHLORIDE 5 MG/ML
10 INJECTION, SOLUTION INTRAVENOUS
Status: DISCONTINUED | OUTPATIENT
Start: 2022-08-04 | End: 2022-08-04 | Stop reason: HOSPADM

## 2022-08-04 RX ORDER — SODIUM CHLORIDE 9 MG/ML
INJECTION, SOLUTION INTRAVENOUS CONTINUOUS
Status: DISCONTINUED | OUTPATIENT
Start: 2022-08-04 | End: 2022-08-04 | Stop reason: HOSPADM

## 2022-08-04 RX ORDER — HYDRALAZINE HYDROCHLORIDE 20 MG/ML
10 INJECTION INTRAMUSCULAR; INTRAVENOUS
Status: DISCONTINUED | OUTPATIENT
Start: 2022-08-04 | End: 2022-08-04 | Stop reason: HOSPADM

## 2022-08-04 RX ORDER — MIDAZOLAM HYDROCHLORIDE 1 MG/ML
INJECTION INTRAMUSCULAR; INTRAVENOUS PRN
Status: DISCONTINUED | OUTPATIENT
Start: 2022-08-04 | End: 2022-08-04 | Stop reason: SDUPTHER

## 2022-08-04 RX ORDER — PROPOFOL 10 MG/ML
INJECTION, EMULSION INTRAVENOUS PRN
Status: DISCONTINUED | OUTPATIENT
Start: 2022-08-04 | End: 2022-08-04 | Stop reason: SDUPTHER

## 2022-08-04 RX ORDER — SODIUM CHLORIDE 9 MG/ML
INJECTION, SOLUTION INTRAVENOUS PRN
Status: DISCONTINUED | OUTPATIENT
Start: 2022-08-04 | End: 2022-08-04 | Stop reason: HOSPADM

## 2022-08-04 RX ORDER — PROPOFOL 10 MG/ML
INJECTION, EMULSION INTRAVENOUS CONTINUOUS PRN
Status: DISCONTINUED | OUTPATIENT
Start: 2022-08-04 | End: 2022-08-04 | Stop reason: SDUPTHER

## 2022-08-04 RX ORDER — FENTANYL CITRATE 50 UG/ML
50 INJECTION, SOLUTION INTRAMUSCULAR; INTRAVENOUS EVERY 5 MIN PRN
Status: DISCONTINUED | OUTPATIENT
Start: 2022-08-04 | End: 2022-08-04 | Stop reason: HOSPADM

## 2022-08-04 RX ORDER — PROCHLORPERAZINE EDISYLATE 5 MG/ML
5 INJECTION INTRAMUSCULAR; INTRAVENOUS
Status: DISCONTINUED | OUTPATIENT
Start: 2022-08-04 | End: 2022-08-04 | Stop reason: HOSPADM

## 2022-08-04 RX ORDER — SODIUM CHLORIDE 0.9 % (FLUSH) 0.9 %
5-40 SYRINGE (ML) INJECTION PRN
Status: DISCONTINUED | OUTPATIENT
Start: 2022-08-04 | End: 2022-08-04 | Stop reason: HOSPADM

## 2022-08-04 RX ORDER — FENTANYL CITRATE 50 UG/ML
25 INJECTION, SOLUTION INTRAMUSCULAR; INTRAVENOUS EVERY 5 MIN PRN
Status: DISCONTINUED | OUTPATIENT
Start: 2022-08-04 | End: 2022-08-04 | Stop reason: HOSPADM

## 2022-08-04 RX ORDER — SODIUM CHLORIDE 0.9 % (FLUSH) 0.9 %
5-40 SYRINGE (ML) INJECTION EVERY 12 HOURS SCHEDULED
Status: DISCONTINUED | OUTPATIENT
Start: 2022-08-04 | End: 2022-08-04 | Stop reason: HOSPADM

## 2022-08-04 RX ORDER — ONDANSETRON 2 MG/ML
4 INJECTION INTRAMUSCULAR; INTRAVENOUS
Status: DISCONTINUED | OUTPATIENT
Start: 2022-08-04 | End: 2022-08-04 | Stop reason: HOSPADM

## 2022-08-04 RX ORDER — BUPIVACAINE HYDROCHLORIDE 5 MG/ML
INJECTION, SOLUTION EPIDURAL; INTRACAUDAL
Status: COMPLETED | OUTPATIENT
Start: 2022-08-04 | End: 2022-08-04

## 2022-08-04 RX ORDER — HYDROCODONE BITARTRATE AND ACETAMINOPHEN 5; 325 MG/1; MG/1
1 TABLET ORAL EVERY 6 HOURS PRN
Qty: 5 TABLET | Refills: 0 | Status: SHIPPED | OUTPATIENT
Start: 2022-08-04 | End: 2022-08-07

## 2022-08-04 RX ORDER — ACETAMINOPHEN 325 MG/1
650 TABLET ORAL
Status: COMPLETED | OUTPATIENT
Start: 2022-08-04 | End: 2022-08-04

## 2022-08-04 RX ADMIN — PROPOFOL 160 MCG/KG/MIN: 10 INJECTION, EMULSION INTRAVENOUS at 10:38

## 2022-08-04 RX ADMIN — SODIUM CHLORIDE: 9 INJECTION, SOLUTION INTRAVENOUS at 10:24

## 2022-08-04 RX ADMIN — LIDOCAINE HYDROCHLORIDE 100 MG: 20 INJECTION, SOLUTION EPIDURAL; INFILTRATION; INTRACAUDAL; PERINEURAL at 10:38

## 2022-08-04 RX ADMIN — MIDAZOLAM 2 MG: 1 INJECTION INTRAMUSCULAR; INTRAVENOUS at 10:37

## 2022-08-04 RX ADMIN — FENTANYL CITRATE 25 MCG: 50 INJECTION INTRAMUSCULAR; INTRAVENOUS at 11:03

## 2022-08-04 RX ADMIN — FENTANYL CITRATE 25 MCG: 50 INJECTION INTRAMUSCULAR; INTRAVENOUS at 10:47

## 2022-08-04 RX ADMIN — PROPOFOL 40 MG: 10 INJECTION, EMULSION INTRAVENOUS at 10:38

## 2022-08-04 RX ADMIN — ACETAMINOPHEN 650 MG: 325 TABLET ORAL at 10:29

## 2022-08-04 RX ADMIN — FENTANYL CITRATE 25 MCG: 50 INJECTION INTRAMUSCULAR; INTRAVENOUS at 11:14

## 2022-08-04 RX ADMIN — FENTANYL CITRATE 25 MCG: 50 INJECTION INTRAMUSCULAR; INTRAVENOUS at 10:41

## 2022-08-04 RX ADMIN — CEFAZOLIN 2000 MG: 2 INJECTION, POWDER, FOR SOLUTION INTRAMUSCULAR; INTRAVENOUS at 10:46

## 2022-08-04 ASSESSMENT — PAIN DESCRIPTION - LOCATION
LOCATION: HEAD
LOCATION: BREAST

## 2022-08-04 ASSESSMENT — PAIN DESCRIPTION - FREQUENCY
FREQUENCY: CONTINUOUS
FREQUENCY: CONTINUOUS

## 2022-08-04 ASSESSMENT — PAIN - FUNCTIONAL ASSESSMENT: PAIN_FUNCTIONAL_ASSESSMENT: 0-10

## 2022-08-04 ASSESSMENT — PAIN DESCRIPTION - DESCRIPTORS
DESCRIPTORS: DISCOMFORT
DESCRIPTORS: ACHING

## 2022-08-04 ASSESSMENT — LIFESTYLE VARIABLES: SMOKING_STATUS: 0

## 2022-08-04 ASSESSMENT — PAIN DESCRIPTION - PAIN TYPE
TYPE: ACUTE PAIN
TYPE: SURGICAL PAIN

## 2022-08-04 ASSESSMENT — PAIN DESCRIPTION - ONSET
ONSET: ON-GOING
ONSET: ON-GOING

## 2022-08-04 ASSESSMENT — PAIN SCALES - GENERAL
PAINLEVEL_OUTOF10: 3
PAINLEVEL_OUTOF10: 3

## 2022-08-04 ASSESSMENT — PAIN DESCRIPTION - ORIENTATION: ORIENTATION: LEFT

## 2022-08-04 NOTE — PROGRESS NOTES
To pacu from OR. PT asleep. Dressing to left breast dry and intact. IV infusing. Monitor in sinus rhythm.

## 2022-08-04 NOTE — DISCHARGE INSTRUCTIONS
1.  Diet:  Regular diet as tolerated. 2.  Activity:  No lifting greater than 15 pounds for 3-4 days. Then activity as tolerated per comfort. 3.  Incision care: May shower over dressing starting today. 4.  No lotion to surgical glue. 5.  Please call for follow-up appointment for 2 weeks [phone].   6.  Medications:  Continue your home medications. Can take Tylenol or ibuprofen or prescription pain medication. 7.  Call for temperature greater than 101 degrees F or for excessive bleeding or pain or swelling or inability to void. 8.  May take over the counter laxative or stool softeners as needed. 9.  No swimming for 2 weeks. 10. Wear bra day and night for 1 week per comfort.     Office Phone Number:  [phone]
5

## 2022-08-04 NOTE — OP NOTE
Operative Note      Patient: Ilene Mccartney  YOB: 1955  MRN: [id number]    Date of Procedure: 8/4/2022    Pre-Op Diagnosis: LEFT BREAST MASS    Post-Op Diagnosis: Same       Procedure(s):  RADIOFREQUENCY IDENTIFIED LOCALIZED LEFT BREAST EXCISIONAL BIOPSY    Surgeon(s): Alok Kingston MD    Assistant:   Surgical Assistant: Izola Dakin, RN    Anesthesia: Monitor Anesthesia Care    Estimated Blood Loss (mL): Minimal    Complications: None    Specimens:   ID Type Source Tests Collected by Time Destination   A : A . LEFT BREAST MASS SHORT SUPERIOR LONG LATERAL Tissue Breast SURGICAL PATHOLOGY Alok Kingston MD 8/4/2022 1059        Implants:  * No implants in log *      Drains: * No LDAs found *    Findings: see op note    Detailed Description of Procedure:              Operative Report      Name:  Ilene Mccartney   MRN:  [id number]  Date:  8/4/2022        PREOPERATIVE DIAGNOSIS: left breast mass    POSTOPERATIVE DIAGNOSIS: same    PROCEDURE:RFID localized left breast excisional biopsy    SURGEON: Brooks    ESTIMATED BLOOD LOSS:  Less than 25 mL    SPECIMENS: left breast tissue    ASSISTANT: Tracy Lang    ANESTHESIA: MAC    INDICATIONS FOR PROCEDURE: The patient is a 75 y.o. female who  presents with breast mass seen on imaging and biopsied, and she is here now for RFID localized excisional biopsy for a radial scar. The risks, benefits and alternatives were discussed with the  patient. Questions were answered and she is agreeable to proceed. Ms. Carmen Kang was met by me in the preoperative area. The surgical sites were identified. Consent was obtained. The appropriate breast imaging was reviewed. DESCRIPTION OF OPERATION: The patient was brought to the operating room and  placed on the OR table in the supine position. She was given IV sedation,   and the left breast was prepped and draped in  the usual sterile fashion.   Patient had been to radiology prior to surgery for RFID tag placement. The RFID tag was identified using the localizer. The area around the tag was anesthesized with local anesthetic. An incision was made in the upper outer quadrant on the left breast and carried down through the skin and subcutaneous tissues. The RFID tag was identified, and the tissue around the tag was dissected free from the surrounding breast tissue, using the localizer to approximate adequate margins. I took some additional tissue posteriorly to ensure adequate margins. The specimen was marked with a short stitch superiorly and a long stitch laterally, and this was sent to radiology, which confirmed removal of the area in question, and then sent to pathology for permanent section. Hemostasis was assured using cautery. The wound was injected with local anesthetic and was closed with layered vicryl and 4-0 Monocryl and covered with surgical glue. Sponge, needle and instrument counts were correct per nursing. The patient tolerated the procedure well. She was taken to the  recovery room in stable condition.     Electronically signed by Devonte Haq MD on 8/4/2022 at 11:30 AM        Electronically signed by Devonte Haq MD on 8/4/2022 at 11:30 AM

## 2022-08-04 NOTE — PROGRESS NOTES
CLINICAL PHARMACY NOTE: MEDS TO BEDS    Total # of Prescriptions Filled: 1   The following medications were delivered to the patient:  Discharge Medication List as of 8/4/2022 12:16 PM        START taking these medications    Details   HYDROcodone-acetaminophen (NORCO) 5-325 MG per tablet Take 1 tablet by mouth every 6 hours as needed for Pain (May take 2 for pain scale 7-10) for up to 3 days. , Disp-5 tablet, R-0Print               Additional Documentation:  Medication was delivered

## 2022-08-04 NOTE — ANESTHESIA PRE PROCEDURE
Department of Anesthesiology  Preprocedure Note       Name:  Leonel Tobin   Age:  75 y.o.  :  1955                                          MRN:  [id number]         Date:  2022      Surgeon: Delphine Boone): Viri Hicks MD    Procedure: Procedure(s):  RADIOFREQUENCY IDENTIFIED LOCALIZED LEFT BREAST EXCISIONAL BIOPSY    Medications prior to admission:   Prior to Admission medications    Medication Sig Start Date End Date Taking? Authorizing Provider   estradiol (ESTRACE) 1 MG tablet TAKE ONE TABLET BY MOUTH DAILY 22   Katy Mcdonough MD   potassium chloride (KLOR-CON) 20 MEQ packet Take 20 mEq by mouth in the morning and 20 mEq before bedtime. Historical Provider, MD   Cholecalciferol (VITAMIN D3) 50 MCG ( UT) CAPS Take by mouth daily    Historical Provider, MD   Meloxicam 15 MG TBDP Take by mouth as needed    Historical Provider, MD   Fluticasone Propionate (FLONASE NA) by Nasal route as needed    Historical Provider, MD   loratadine (CLARITIN) 10 MG tablet Take 10 mg by mouth daily as needed    Historical Provider, MD   chlorthalidone (HYGROTON) 25 MG tablet Take 25 mg by mouth daily 3/2/20   Historical Provider, MD   omeprazole (PRILOSEC) 40 MG capsule Take 40 mg by mouth daily  14   Historical Provider, MD       Current medications:    No current facility-administered medications for this encounter. Allergies:     Allergies   Allergen Reactions    Latex Hives, Itching and Rash     \"around the site\"      Codeine Hives    Ezetimibe Other (See Comments)     Muscle pain     Pravastatin Itching    Tetanus Toxoids Swelling     Swelling at injection site       Problem List:    Patient Active Problem List   Diagnosis Code    Pelvic pain in female R10.2    Vaginal dryness N89.8    Menopause Z78.0    Right lumbar radiculitis M54.16    DDD (degenerative disc disease), lumbar M51.36    Thoracic spine pain M54.6    Lumbar spine pain M54.50       Past Medical History: Diagnosis Date    DDD (degenerative disc disease), lumbar 02/05/2019    Thoracic spine pain    Hemorrhoids, external     HIGH CHOLESTEROL     Hypertension     IBS (irritable bowel syndrome)     Right lumbar radiculitis 02/05/2019    Vaginal wall prolapse     Wears glasses        Past Surgical History:        Procedure Laterality Date    CERVICAL FUSION  2009    CYSTOSCOPY N/A 3/21/2022    CYSTOSCOPY INJECTION OF URETHRAL BULKING AGENT- BULKAMID performed by Alin Boston MD at [address] (CERVIX STATUS UNKNOWN)      prolapsed uterus, still with ovaries    [facility] LOC BREAST BIOPSY LEFT Left 6/20/2022    DAKOTA [facility] LOC BREAST BIOPSY LEFT 6/20/2022 [facility] [address]    NERVE BLOCK LUMB FACET LEVEL 1 BILATERAL Right 3/11/2019    RIGHT L4/L5 FACET CYST ASPIRATE WITH FLUOROSCOPY (77600) performed by Lillian Bhatti MD at [address] Right 12/16/2019    RIGHT L4/5 LUMBAR FACET CYST ASPIRATION WITH FLUOROSCOPY performed by Lillian Bhatti MD at [address] [place] [address] N/A 3/11/2020    CAUDAL LUMBAR EPIDURAL STEROID INJECTION performed by Lillian Bhatti MD at [address] [place] [address] N/A 11/4/2020    CAUDAL LUMBAR EPIDURAL STEROID INJECTION WITH FLUOROSCOPY performed by Lillian Bhatti MD at [address] [place] [address] N/A 11/4/2020    AND COCCYGEAL JOINT INJECTION WITH FLUOROSCOPY performed by Lillian Bhatti MD at [address] [place] [address] Right 12/28/2020    RIGHT L4 AND L5 TRANSFORAMINAL EPIDURAL STEROID INJECTION WITH FLUOROSCOPY performed by Lillian Bhatti MD at [address]  2006       Social History:    Social History     Tobacco Use    Smoking status: Never    Smokeless tobacco: Never   Substance Use Topics    Alcohol use:  Yes     Alcohol/week: 0.0 standard drinks     Comment: Occasions                                Counseling given: Not Not Detected 12/23/2020 12:59 PM    COVID19 Not Detected 10/30/2020 01:42 PM           Anesthesia Evaluation   no history of anesthetic complications:   Airway: Mallampati: II  TM distance: >3 FB     Mouth opening: > = 3 FB   Dental:    (+) implants and bridge      Pulmonary:       (-) COPD, asthma, sleep apnea, rhonchi, wheezes, rales and not a current smoker                           Cardiovascular:  Exercise tolerance: good (>4 METS),   (+) hypertension:,     (-) past MI, orthopnea,  DARBY, murmur, weak pulses and peripheral edema      Rhythm: regular  Rate: normal                 ROS comment: Stress Echo (2020):  Summary:   Negative ECG for ischemia with graded exercise test.   Duke Treadmill Score is 8 which indicates low risk. Negative stress echo with no indication of inducible ischemia. Stress echo findings indicate low risk for future ischemic event . Neuro/Psych:      (-) seizures, TIA and CVA            ROS comment: Chronic thoracic / lumbar back pain with radiculopathy, DDD, follows with pain management GI/Hepatic/Renal:        (-) liver disease, no renal disease and no morbid obesity       Endo/Other:    (+) electrolyte abnormalities (oral potassium supplement), .    (-) diabetes mellitus, hypothyroidism, hyperthyroidism, blood dyscrasia               Abdominal:         (-) obese       Vascular: Other Findings: Very pleasant. Seen upon arrival to preop. PIV to be placed. Anesthesia Plan      MAC     ASA 2     (NPO appropriate. Ms. Bren Barrett denies active nausea / reflux.)        Anesthetic plan and risks discussed with patient. Plan discussed with CRNA. This pre-anesthesia assessment may be used as a history and physical.    DOS STAFF ADDENDUM:    Pt seen and examined, chart reviewed (including anesthesia, drug and allergy history). No interval changes to history and physical examination.   Anesthetic plan, risks, benefits, alternatives, and personnel involved discussed with patient. Patient verbalized an understanding and agrees to proceed.       Romulo Meredith MD  August 4, 2022  9:59 AM

## 2022-08-04 NOTE — PROGRESS NOTES
Patient arrived to phase 2 from pacu. Vss and dressing clean dry and intact. Tolerating po and family at bedside. Pt reports headache from not having coffee this am. Coffee given per request . Will monitor.

## 2022-08-04 NOTE — H&P
Update History & Physical    The patient's History and Physical of August 1, 2022 was reviewed with the patient and I examined the patient. There was no change. The surgical site was confirmed by the patient and me. Plan: The risks, benefits, expected outcome, and alternative to the recommended procedure have been discussed with the patient. Patient understands and wants to proceed with the procedure.      Electronically signed by Raul Webb MD on 8/4/2022 at 10:11 AM

## 2022-08-04 NOTE — ANESTHESIA POSTPROCEDURE EVALUATION
Department of Anesthesiology  Postprocedure Note    Patient: Charles Torres  MRN: [id number]  YOB: 1955  Date of evaluation: 8/4/2022      Procedure Summary     Date: 08/04/22 Room / Location: [address]    Anesthesia Start: 4079 Anesthesia Stop: 7294    Procedure: RADIOFREQUENCY IDENTIFIED LOCALIZED LEFT BREAST EXCISIONAL BIOPSY (Left: Breast) Diagnosis:       Left breast mass      (LEFT BREAST MASS)    Surgeons: Sherrine Kanner, MD Responsible Provider: Gaby Escobedo MD    Anesthesia Type: MAC ASA Status: 2          Anesthesia Type: No value filed. Fredy Phase I: Fredy Score: 5    Fredy Phase II:        Anesthesia Post Evaluation    Patient location during evaluation: PACU  Patient participation: complete - patient participated  Level of consciousness: sleepy but conscious  Airway patency: patent  Nausea & Vomiting: no nausea and no vomiting  Complications: no  Cardiovascular status: hemodynamically stable and blood pressure returned to baseline  Respiratory status: spontaneous ventilation and nonlabored ventilation  Hydration status: stable  Comments: Ms. Toshia Arce was seen resting comfortably immediately following procedure. Anticipate return to [facility] for planned discharge home with .

## 2022-08-10 ENCOUNTER — TELEPHONE (OUTPATIENT)
Dept: SURGERY | Age: 67
End: 2022-08-10

## 2022-08-22 ENCOUNTER — OFFICE VISIT (OUTPATIENT)
Dept: BREAST CENTER | Age: 67
End: 2022-08-22

## 2022-08-22 VITALS
HEART RATE: 71 BPM | HEIGHT: 63 IN | WEIGHT: 159 LBS | RESPIRATION RATE: 16 BRPM | BODY MASS INDEX: 28.17 KG/M2 | SYSTOLIC BLOOD PRESSURE: 120 MMHG | OXYGEN SATURATION: 98 % | DIASTOLIC BLOOD PRESSURE: 80 MMHG

## 2022-08-22 DIAGNOSIS — Z12.31 VISIT FOR SCREENING MAMMOGRAM: ICD-10-CM

## 2022-08-22 DIAGNOSIS — N63.20 LEFT BREAST MASS: Primary | ICD-10-CM

## 2022-08-22 PROCEDURE — 99024 POSTOP FOLLOW-UP VISIT: CPT | Performed by: SURGERY

## 2022-08-22 NOTE — PROGRESS NOTES
Subjective:      Patient ID: Pau Lizama is a 75 y.o. female. HPI   Chief Complaint   Patient presents with    Post-Op Check     Patient presents today for a post op check. Surgery was on 8/4/22. Patient is s/p left breast excisional biopsy of a radial scar, path benign. Wound healing well, instructed on wound care.  Follow up in 3 months with bilateral screening mammogram.    Past Medical History:   Diagnosis Date    DDD (degenerative disc disease), lumbar 02/05/2019    Thoracic spine pain    Hemorrhoids, external     HIGH CHOLESTEROL     Hypertension     IBS (irritable bowel syndrome)     Right lumbar radiculitis 02/05/2019    Vaginal wall prolapse     Wears glasses        Past Surgical History:   Procedure Laterality Date    BREAST BIOPSY Left 8/4/2022    RADIOFREQUENCY IDENTIFIED LOCALIZED LEFT BREAST EXCISIONAL BIOPSY performed by Ramon Putnam MD at [facility]  2009    CYSTOSCOPY N/A 3/21/2022    CYSTOSCOPY INJECTION OF URETHRAL BULKING AGENT- BULKAMID performed by Lynne Ramos MD at [address] [place] (CERVIX STATUS UNKNOWN)      prolapsed uterus, still with ovaries    [facility] LOC BREAST BIOPSY LEFT Left 6/20/2022    [facility] LOC BREAST BIOPSY LEFT 6/20/2022 [facility] LUMB FACET LEVEL 1 BILATERAL Right 3/11/2019    RIGHT L4/L5 FACET CYST ASPIRATE WITH FLUOROSCOPY (87365) performed by Becca Begum MD at [address] [facility] Right 12/16/2019    RIGHT L4/5 LUMBAR FACET CYST ASPIRATION WITH FLUOROSCOPY performed by Becca Begum MD at [address] [facility] N/A 3/11/2020    CAUDAL LUMBAR EPIDURAL STEROID INJECTION performed by Becca Begum MD at [address] [facility] N/A 11/4/2020    CAUDAL LUMBAR EPIDURAL STEROID INJECTION WITH FLUOROSCOPY performed by Becca Begum MD at [address] [facility] N/A 11/4/2020    AND COCCYGEAL JOINT INJECTION WITH FLUOROSCOPY performed by Kiana Chavis MD at [address] Right 12/28/2020    RIGHT L4 AND L5 TRANSFORAMINAL EPIDURAL STEROID INJECTION WITH FLUOROSCOPY performed by Kiana Chavis MD at [address] [facility]  2006       Current Outpatient Medications   Medication Sig Dispense Refill    estradiol (ESTRACE) 1 MG tablet TAKE ONE TABLET BY MOUTH DAILY 90 tablet 0    potassium chloride (KLOR-CON) 20 MEQ packet Take 20 mEq by mouth in the morning and 20 mEq before bedtime. Cholecalciferol (VITAMIN D3) 50 MCG (2000 UT) CAPS Take by mouth daily      Meloxicam 15 MG TBDP Take by mouth as needed      Fluticasone Propionate (FLONASE NA) by Nasal route as needed      loratadine (CLARITIN) 10 MG tablet Take 10 mg by mouth daily as needed      chlorthalidone (HYGROTON) 25 MG tablet Take 25 mg by mouth daily      omeprazole (PRILOSEC) 40 MG capsule Take 40 mg by mouth daily        No current facility-administered medications for this visit. Social History     Socioeconomic History    Marital status:      Spouse name: Not on file    Number of children: Not on file    Years of education: Not on file    Highest education level: Not on file   Occupational History    Not on file   Tobacco Use    Smoking status: Never    Smokeless tobacco: Never   Vaping Use    Vaping Use: Never used   Substance and Sexual Activity    Alcohol use:  Yes     Alcohol/week: 0.0 standard drinks     Comment: Occasions    Drug use: Never    Sexual activity: Yes     Partners: Male   Other Topics Concern    Not on file   Social History Narrative    Not on file     Social Determinants of Health     Financial Resource Strain: Not on file   Food Insecurity: Not on file   Transportation Needs: Not on file   Physical Activity: Not on file   Stress: Not on file   Social Connections: Not on file   Intimate Partner Violence: Not on file   Housing Stability: Not on file       Objective:   Physical

## 2022-08-26 DIAGNOSIS — Z78.0 MENOPAUSE: ICD-10-CM

## 2022-08-27 RX ORDER — ESTRADIOL 1 MG/1
TABLET ORAL
Qty: 90 TABLET | Refills: 0 | Status: SHIPPED | OUTPATIENT
Start: 2022-08-27

## 2022-09-12 LAB
ALBUMIN SERPL-MCNC: 4 G/DL
ALP BLD-CCNC: 46 U/L
ALT SERPL-CCNC: 11 U/L
ANION GAP SERPL CALCULATED.3IONS-SCNC: 8 MMOL/L
AST SERPL-CCNC: 16 U/L
BILIRUB SERPL-MCNC: 0.5 MG/DL (ref 0.1–1.4)
BUN BLDV-MCNC: 21 MG/DL
CALCIUM SERPL-MCNC: 9.5 MG/DL
CHLORIDE BLD-SCNC: 99 MMOL/L
CHOLESTEROL, FASTING: 162
CO2: 32 MMOL/L
CREAT SERPL-MCNC: 0.85 MG/DL
GFR CALCULATED: 74
GLUCOSE BLD-MCNC: 99 MG/DL
HDLC SERPL-MCNC: 63 MG/DL (ref 35–70)
LDL CHOLESTEROL CALCULATED: 78 MG/DL (ref 0–160)
POTASSIUM SERPL-SCNC: 3.1 MMOL/L
SODIUM BLD-SCNC: 139 MMOL/L
TOTAL PROTEIN: 6.1
TRIGLYCERIDE, FASTING: 104

## 2022-11-16 ENCOUNTER — OFFICE VISIT (OUTPATIENT)
Dept: BREAST CENTER | Age: 67
End: 2022-11-16
Payer: MEDICARE

## 2022-11-16 ENCOUNTER — OFFICE VISIT (OUTPATIENT)
Dept: FAMILY MEDICINE CLINIC | Age: 67
End: 2022-11-16
Payer: MEDICARE

## 2022-11-16 ENCOUNTER — HOSPITAL ENCOUNTER (OUTPATIENT)
Dept: WOMENS IMAGING | Age: 67
Discharge: HOME | End: 2022-11-16
Payer: MEDICARE

## 2022-11-16 VITALS
RESPIRATION RATE: 14 BRPM | BODY MASS INDEX: 27.93 KG/M2 | HEIGHT: 63 IN | DIASTOLIC BLOOD PRESSURE: 78 MMHG | SYSTOLIC BLOOD PRESSURE: 124 MMHG | TEMPERATURE: 98.4 F | OXYGEN SATURATION: 98 % | WEIGHT: 157.6 LBS | HEART RATE: 70 BPM

## 2022-11-16 VITALS
HEART RATE: 75 BPM | WEIGHT: 157 LBS | HEIGHT: 63 IN | SYSTOLIC BLOOD PRESSURE: 138 MMHG | DIASTOLIC BLOOD PRESSURE: 90 MMHG | RESPIRATION RATE: 18 BRPM | OXYGEN SATURATION: 100 % | BODY MASS INDEX: 27.82 KG/M2

## 2022-11-16 DIAGNOSIS — M48.061 SPINAL STENOSIS, LUMBAR REGION WITHOUT NEUROGENIC CLAUDICATION: ICD-10-CM

## 2022-11-16 DIAGNOSIS — M48.02 SPINAL STENOSIS, CERVICAL REGION: ICD-10-CM

## 2022-11-16 DIAGNOSIS — K21.9 GASTROESOPHAGEAL REFLUX DISEASE WITHOUT ESOPHAGITIS: ICD-10-CM

## 2022-11-16 DIAGNOSIS — N64.89 RADIAL SCAR OF LEFT BREAST: Primary | ICD-10-CM

## 2022-11-16 DIAGNOSIS — F41.9 ANXIETY: ICD-10-CM

## 2022-11-16 DIAGNOSIS — I10 ESSENTIAL HYPERTENSION: Primary | ICD-10-CM

## 2022-11-16 DIAGNOSIS — Z12.31 VISIT FOR SCREENING MAMMOGRAM: ICD-10-CM

## 2022-11-16 DIAGNOSIS — E78.49 FAMILIAL MULTIPLE LIPOPROTEIN-TYPE HYPERLIPIDEMIA: ICD-10-CM

## 2022-11-16 PROBLEM — M54.12 CERVICAL RADICULOPATHY: Status: ACTIVE | Noted: 2021-09-16

## 2022-11-16 PROBLEM — M54.16 LUMBAR RADICULOPATHY: Status: ACTIVE | Noted: 2021-09-16

## 2022-11-16 PROCEDURE — 3074F SYST BP LT 130 MM HG: CPT | Performed by: NURSE PRACTITIONER

## 2022-11-16 PROCEDURE — 3074F SYST BP LT 130 MM HG: CPT | Performed by: SURGERY

## 2022-11-16 PROCEDURE — 99203 OFFICE O/P NEW LOW 30 MIN: CPT | Performed by: NURSE PRACTITIONER

## 2022-11-16 PROCEDURE — 1124F ACP DISCUSS-NO DSCNMKR DOCD: CPT | Performed by: SURGERY

## 2022-11-16 PROCEDURE — 3078F DIAST BP <80 MM HG: CPT | Performed by: NURSE PRACTITIONER

## 2022-11-16 PROCEDURE — 99213 OFFICE O/P EST LOW 20 MIN: CPT | Performed by: SURGERY

## 2022-11-16 PROCEDURE — 1124F ACP DISCUSS-NO DSCNMKR DOCD: CPT | Performed by: NURSE PRACTITIONER

## 2022-11-16 PROCEDURE — 3078F DIAST BP <80 MM HG: CPT | Performed by: SURGERY

## 2022-11-16 PROCEDURE — 77067 SCR MAMMO BI INCL CAD: CPT

## 2022-11-16 RX ORDER — DULOXETIN HYDROCHLORIDE 30 MG/1
CAPSULE, DELAYED RELEASE ORAL
COMMUNITY
Start: 2022-08-19 | End: 2022-11-23 | Stop reason: SDUPTHER

## 2022-11-16 RX ORDER — LISINOPRIL 10 MG/1
10 TABLET ORAL DAILY
Qty: 30 TABLET | Refills: 1 | Status: SHIPPED | OUTPATIENT
Start: 2022-11-16

## 2022-11-16 RX ORDER — ROSUVASTATIN CALCIUM 10 MG/1
TABLET, COATED ORAL
COMMUNITY
Start: 2022-10-06

## 2022-11-16 RX ORDER — MELOXICAM 7.5 MG/1
TABLET ORAL
COMMUNITY
Start: 2022-09-01 | End: 2022-11-16

## 2022-11-16 SDOH — ECONOMIC STABILITY: FOOD INSECURITY: WITHIN THE PAST 12 MONTHS, THE FOOD YOU BOUGHT JUST DIDN'T LAST AND YOU DIDN'T HAVE MONEY TO GET MORE.: NEVER TRUE

## 2022-11-16 SDOH — ECONOMIC STABILITY: FOOD INSECURITY: WITHIN THE PAST 12 MONTHS, YOU WORRIED THAT YOUR FOOD WOULD RUN OUT BEFORE YOU GOT MONEY TO BUY MORE.: NEVER TRUE

## 2022-11-16 ASSESSMENT — SOCIAL DETERMINANTS OF HEALTH (SDOH): HOW HARD IS IT FOR YOU TO PAY FOR THE VERY BASICS LIKE FOOD, HOUSING, MEDICAL CARE, AND HEATING?: NOT HARD AT ALL

## 2022-11-16 ASSESSMENT — ENCOUNTER SYMPTOMS
SHORTNESS OF BREATH: 0
ABDOMINAL PAIN: 0
COUGH: 0

## 2022-11-16 ASSESSMENT — PATIENT HEALTH QUESTIONNAIRE - PHQ9
SUM OF ALL RESPONSES TO PHQ QUESTIONS 1-9: 0
2. FEELING DOWN, DEPRESSED OR HOPELESS: 0
SUM OF ALL RESPONSES TO PHQ9 QUESTIONS 1 & 2: 0
SUM OF ALL RESPONSES TO PHQ QUESTIONS 1-9: 0
1. LITTLE INTEREST OR PLEASURE IN DOING THINGS: 0

## 2022-11-16 NOTE — PROGRESS NOTES
Subjective:      Patient ID: Kiran Liao is a 75 y.o. female. HPI   Chief Complaint   Patient presents with    Follow-up     Breast check       Patient is here for follow up breast check. Patient is s/p left breast excisional biopsy of a radial scar 8/2022, path benign. She has not felt any breast masses, no nipple discharge. She does have some occasional discomfort at the surgical site. Bilateral screening mammogram today BIRADS 2C.     Past Medical History:   Diagnosis Date    DDD (degenerative disc disease), lumbar 02/05/2019    Thoracic spine pain    Hemorrhoids, external     HIGH CHOLESTEROL     Hypertension     IBS (irritable bowel syndrome)     Right lumbar radiculitis 02/05/2019    Vaginal wall prolapse     Wears glasses        Past Surgical History:   Procedure Laterality Date    BREAST BIOPSY Left 8/4/2022    RADIOFREQUENCY IDENTIFIED LOCALIZED LEFT BREAST EXCISIONAL BIOPSY performed by Pauly Vaz MD at [facility]  2009    CYSTOSCOPY N/A 3/21/2022    CYSTOSCOPY INJECTION OF URETHRAL BULKING AGENT- BULKAMID performed by Thom Lopez MD at [address] (CERVIX STATUS UNKNOWN)      prolapsed uterus, still with ovaries    [facility] STEROTACT[facility] LOC BREAST BIOPSY LEFT Left 6/20/2022    [facility] STEROT[facility] LOC BREAST BIOPSY LEFT 6/20/2022 [facility] [address] [facility] [address] LUMB FACET LEVEL 1 BILATERAL Right 3/11/2019    RIGHT L4/L5 FACET CYST ASPIRATE WITH FLUOROSCOPY (12660) performed by Aries Lima MD at [address] [facility] Right 12/16/2019    RIGHT L4/5 LUMBAR FACET CYST ASPIRATION WITH FLUOROSCOPY performed by Aries Lima MD at [address] [facility] N/A 3/11/2020    CAUDAL LUMBAR EPIDURAL STEROID INJECTION performed by Aries Lima MD at [address] [facility] N/A 11/4/2020    CAUDAL LUMBAR EPIDURAL STEROID INJECTION WITH FLUOROSCOPY performed by Aries Lima MD at [address] [facility] PAIN MANAGEMENT PROCEDURE N/A 2020    AND COCCYGEAL JOINT INJECTION WITH FLUOROSCOPY performed by Ada Khan MD at [address] Right 2020    RIGHT L4 AND L5 TRANSFORAMINAL EPIDURAL STEROID INJECTION WITH FLUOROSCOPY performed by Ada Khan MD at [address] [facility]         Current Outpatient Medications   Medication Sig Dispense Refill    DULoxetine (CYMBALTA) 30 MG extended release capsule       rosuvastatin (CRESTOR) 10 MG tablet       lisinopril (PRINIVIL;ZESTRIL) 10 MG tablet Take 1 tablet by mouth daily For high blood pressure. 30 tablet 1    estradiol (ESTRACE) 1 MG tablet TAKE ONE TABLET BY MOUTH DAILY 90 tablet 0    Cholecalciferol (VITAMIN D3) 50 MCG ( UT) CAPS Take by mouth daily      Meloxicam 15 MG TBDP Take by mouth as needed      Fluticasone Propionate (FLONASE NA) by Nasal route as needed      loratadine (CLARITIN) 10 MG tablet Take 10 mg by mouth daily as needed      omeprazole (PRILOSEC) 40 MG capsule Take 40 mg by mouth daily        No current facility-administered medications for this visit. Social History     Socioeconomic History    Marital status:      Spouse name: Liliana Calle    Number of children: 2    Years of education: Not on file    Highest education level: Not on file   Occupational History    Not on file   Tobacco Use    Smoking status: Former     Packs/day: 0.50     Years: 20.00     Pack years: 10.00     Types: Cigarettes     Quit date:      Years since quittin.8    Smokeless tobacco: Never   Vaping Use    Vaping Use: Never used   Substance and Sexual Activity    Alcohol use:  Yes     Alcohol/week: 0.0 standard drinks     Comment: Occasions    Drug use: Never    Sexual activity: Yes     Partners: Male   Other Topics Concern    Not on file   Social History Narrative    Not on file     Social Determinants of Health     Financial Resource Strain: Low Risk     Difficulty of Paying Living Expenses: Not hard at all Food Insecurity: No Food Insecurity    Worried About Running Out of Food in the Last Year: Never true    Ran Out of Food in the Last Year: Never true   Transportation Needs: Not on file   Physical Activity: Not on file   Stress: Not on file   Social Connections: Not on file   Intimate Partner Violence: Not on file   Housing Stability: Not on file       Objective:   Physical Exam    Bilateral breasts - Normal contour, no masses, no nipple changes. Well healed incision left lateral breast.  No cervical or axillary adenopathy. Assessment:      Diagnosis Orders   1. Radial scar of left breast        2. Visit for screening mammogram               Plan:     Mammogram was reviewed. No masses or suspicious findings on exam. Healthy lifestyle modifications were reviewed with the patient. We discussed genetic testing, and she declines. Continue monthly self breast exam, yearly mammogram. Follow up as needed. On this date 11/16/22 I have spent 20 minutes reviewing previous notes, test results, and face to face with the patient discussing the diagnosis and importance of compliance with the treatment plan as well as documenting on the day of the visit.      Electronically signed by Gerry Armstrong MD on 11/16/2022 at 11:28 AM

## 2022-11-16 NOTE — PROGRESS NOTES
2022    Hannah Fan (:  1955) is a 75 y.o. female, here for evaluation of the following medical concerns:  Chief Complaint   Patient presents with    New Patient     Establish care. Question about low potassium and her meds. Is having body aches with the crestor. HPI  Patient is here for a new patient visit. Previous PCP was Dr. Lsely Maldonado. Hypertension:    Has been on chlorthalidone 25 mg daily about 2 years ago. Developed low potassium and then started on potassium supplement. Would like to stop the chlorthalidone as she doesn't like taking the potassium supplement. Denies history of edema. Denies kidney problems. Hyperlipidemia:  Has been prescribed crestor 10 mg daily. Has been prescribed statins in the past and that caused muscle aches and some with itching. When she took the crestor 10 mg did not feel good with muscle aches. Three weeks ago decreased down to 5 mg daily and able to tolerate. Had evaluation with endo Dr. Ana Brewer 2020     No results found for: LABA1C  Lab Results   Component Value Date    CREATININE 0.85 2022     Lab Results   Component Value Date    ALT 11 2022    AST 16 2022     Lab Results   Component Value Date    HDL 63 2022    LDLCALC 78 2022        Anxiety- on cymbalta 30 mg daily   Feels that anxiety is controlled on medication. Son passed away 2022. Denies depression. GYN- Dr. Christine Corbin   On estradiol 1 mg daily for hot flashes     Spinal stenosis in cervical and lumbar- follows with Dr. Maida Balbuena and on meloxicam 15 mg daily PRN. Takes only a couple of times per month. Gets epidural injections. GERD- on omeprazole 40 mg daily. GI is Dr. Guerita Garcia     Colonoscopy 2021    Review of Systems   Constitutional:  Negative for activity change. Respiratory:  Negative for cough and shortness of breath. Cardiovascular:  Negative for chest pain and leg swelling.    Gastrointestinal:  Negative for abdominal pain. Neurological:  Negative for dizziness and headaches. Psychiatric/Behavioral:  Negative for dysphoric mood, sleep disturbance and suicidal ideas. The patient is nervous/anxious. Prior to Visit Medications    Medication Sig Taking? Authorizing Provider   DULoxetine (CYMBALTA) 30 MG extended release capsule  Yes Historical Provider, MD   rosuvastatin (CRESTOR) 10 MG tablet  Yes Historical Provider, MD   estradiol (ESTRACE) 1 MG tablet TAKE ONE TABLET BY MOUTH DAILY Yes Hilario Caraballo MD   Cholecalciferol (VITAMIN D3) 50 MCG (2000 UT) CAPS Take by mouth daily Yes Historical Provider, MD   Meloxicam 15 MG TBDP Take by mouth as needed Yes Historical Provider, MD   Fluticasone Propionate (FLONASE NA) by Nasal route as needed Yes Historical Provider, MD   loratadine (CLARITIN) 10 MG tablet Take 10 mg by mouth daily as needed Yes Historical Provider, MD   chlorthalidone (HYGROTON) 25 MG tablet Take 25 mg by mouth daily Yes Historical Provider, MD   omeprazole (PRILOSEC) 40 MG capsule Take 40 mg by mouth daily  Yes Historical Provider, MD   potassium chloride (KLOR-CON) 20 MEQ packet Take 20 mEq by mouth in the morning and 20 mEq before bedtime.   Patient not taking: Reported on 11/16/2022  Historical Provider, MD        Allergies   Allergen Reactions    Latex Hives, Itching and Rash     \"around the site\"      Codeine Hives    Ezetimibe Other (See Comments)     Muscle pain     Pravastatin Itching    Tetanus Toxoids Swelling     Swelling at injection site       Past Medical History:   Diagnosis Date    DDD (degenerative disc disease), lumbar 02/05/2019    Thoracic spine pain    Hemorrhoids, external     HIGH CHOLESTEROL     Hypertension     IBS (irritable bowel syndrome)     Right lumbar radiculitis 02/05/2019    Vaginal wall prolapse     Wears glasses        Past Surgical History:   Procedure Laterality Date    BREAST BIOPSY Left 8/4/2022    RADIOFREQUENCY IDENTIFIED LOCALIZED LEFT BREAST EXCISIONAL BIOPSY performed by Krista Seo MD at [facility]  2009    CYSTOSCOPY N/A 3/21/2022    CYSTOSCOPY INJECTION OF URETHRAL BULKING AGENT- BULKAMID performed by Jana Cordero MD at [address] (CERVIX STATUS UNKNOWN)      prolapsed uterus, still with ovaries    [facility] BREAST BIOPSY LEFT Left 2022    [facility] BREAST BIOPSY LEFT 2022 [facility] LUMB FACET LEVEL 1 BILATERAL Right 3/11/2019    RIGHT L4/L5 FACET CYST ASPIRATE WITH FLUOROSCOPY (14561) performed by Kaitlynn Cota MD at [address] [facility] Right 2019    RIGHT L4/5 LUMBAR FACET CYST ASPIRATION WITH FLUOROSCOPY performed by Kaitlynn Cota MD at [address] [place] [address] N/A 3/11/2020    CAUDAL LUMBAR EPIDURAL STEROID INJECTION performed by Kaitlynn Cota MD at [address] [facility] N/A 2020    CAUDAL LUMBAR EPIDURAL STEROID INJECTION WITH FLUOROSCOPY performed by Kaitlynn Cota MD at [address] [place] [address] N/A 2020    AND COCCYGEAL JOINT INJECTION WITH FLUOROSCOPY performed by Kaitlynn Cota MD at [address] [place] [address] Right 2020    RIGHT L4 AND L5 TRANSFORAMINAL EPIDURAL STEROID INJECTION WITH FLUOROSCOPY performed by Kaitlynn Cota MD at [facility]         Social History     Socioeconomic History    Marital status:      Spouse name: Belen Ford    Number of children: 2    Years of education: Not on file    Highest education level: Not on file   Occupational History    Not on file   Tobacco Use    Smoking status: Former     Packs/day: 0.50     Years: 20.00     Pack years: 10.00     Types: Cigarettes     Quit date:      Years since quittin.8    Smokeless tobacco: Never   Vaping Use    Vaping Use: Never used   Substance and Sexual Activity    Alcohol use:  Yes     Alcohol/week: 0.0 standard drinks Comment: Occasions    Drug use: Never    Sexual activity: Yes     Partners: Male   Other Topics Concern    Not on file   Social History Narrative    Not on file     Social Determinants of Health     Financial Resource Strain: Low Risk     Difficulty of Paying Living Expenses: Not hard at all   Food Insecurity: No Food Insecurity    Worried About Running Out of Food in the Last Year: Never true    Ran Out of Food in the Last Year: Never true   Transportation Needs: Not on file   Physical Activity: Not on file   Stress: Not on file   Social Connections: Not on file   Intimate Partner Violence: Not on file   Housing Stability: Not on file        Family History   Problem Relation Age of Onset    Lung Cancer Mother     COPD Father     Diabetes Maternal Grandmother     Uterine Cancer Maternal Grandmother     Diabetes Paternal Grandmother     Muscular Dystrophy Son     Breast Cancer Maternal Aunt     Colon Cancer Maternal Aunt     Rheum Arthritis Neg Hx     Osteoarthritis Neg Hx     Asthma Neg Hx     Heart Failure Neg Hx     High Cholesterol Neg Hx     Hypertension Neg Hx     Migraines Neg Hx     Ovarian Cancer Neg Hx     Rashes/Skin Problems Neg Hx     Seizures Neg Hx     Stroke Neg Hx     Thyroid Disease Neg Hx        Vitals:    11/16/22 0845   BP: 124/78   Site: Right Upper Arm   Position: Sitting   Cuff Size: Medium Adult   Pulse: 70   Resp: 14   Temp: 98.4 °F (36.9 °C)   TempSrc: Oral   SpO2: 98%   Weight: 157 lb 9.6 oz (71.5 kg)   Height: 5' 3.31\" (1.608 m)     Estimated body mass index is 27.65 kg/m² as calculated from the following:    Height as of this encounter: 5' 3.31\" (1.608 m). Weight as of this encounter: 157 lb 9.6 oz (71.5 kg). Physical Exam  Vitals and nursing note reviewed. Constitutional:       General: She is not in acute distress. Appearance: She is well-developed. HENT:      Head: Normocephalic and atraumatic.       Right Ear: Tympanic membrane, ear canal and external ear normal. Left Ear: Tympanic membrane, ear canal and external ear normal.   Cardiovascular:      Rate and Rhythm: Normal rate and regular rhythm. Heart sounds: Normal heart sounds. No murmur heard. No friction rub. No gallop. Pulmonary:      Effort: Pulmonary effort is normal. No respiratory distress. Breath sounds: Normal breath sounds. Musculoskeletal:      Cervical back: Neck supple. Right lower leg: No edema. Left lower leg: No edema. Skin:     General: Skin is warm and dry. Neurological:      Mental Status: She is alert and oriented to person, place, and time. Psychiatric:         Behavior: Behavior normal.         Thought Content: Thought content normal.         Judgment: Judgment normal.       ASSESSMENT/PLAN:  1. Essential hypertension  BP is controlled, but doesn't like how she has to take a potassium supplement with the chlorthalidone. Will d/c and start lisinopril. Side effects of mediation discussed. Repeat BMP in a month at f/u visit. - lisinopril (PRINIVIL;ZESTRIL) 10 MG tablet; Take 1 tablet by mouth daily For high blood pressure. Dispense: 30 tablet; Refill: 1    2. Anxiety  Stable on duloxetine 30 mg daily     3. Familial multiple lipoprotein-type hyperlipidemia  Previous evaluation with endo Dr. Rhys Cheadle 12/2020  Was on crestor 10 mg daily, but had increased muscle aches and cut back to crestor 5 mg daily. Lipids controlled 9/2022- repeat in 3-6 months with decreased dose of crestor     4. Spinal stenosis, lumbar region without neurogenic claudication/ Spinal stenosis, cervical region  Follows with Dr. Sen Grijalva and receives epidural injections. She reports that her pain is stable    6. Gastroesophageal reflux disease without esophagitis  Controlled on omeprazole 40 mg daily. Follows with GI Dr. Austin Pond. Patient declines vaccines. Return in about 4 weeks (around 12/14/2022), or if symptoms worsen or fail to improve, for blood pressure.     An  electronic signature was used to authenticate this note.     --Mariama Hernandez, APRN - CNP on 11/16/2022 at 8:52 AM

## 2022-11-23 RX ORDER — DULOXETIN HYDROCHLORIDE 30 MG/1
30 CAPSULE, DELAYED RELEASE ORAL DAILY
Qty: 30 CAPSULE | Refills: 2 | Status: SHIPPED | OUTPATIENT
Start: 2022-11-23

## 2022-11-23 NOTE — TELEPHONE ENCOUNTER
Duloxetine refilled. Possible for the lisinopril to cause diarrhea. Likely will resolve after a couple of weeks if from the lisinopril. If symptoms are not severe, would like for her to continue since she wanted to come off the chlorthalidone. Can eat high fiber diet to help with symptoms.

## 2022-11-23 NOTE — TELEPHONE ENCOUNTER
Pt has a issue with taking the lisinopril once a day pt is having diarrhea it has been 3 days pt has no other side effects pt wants to know should she stop the medication ?     Please advise   Please see refill for cymbalta

## 2022-11-23 NOTE — TELEPHONE ENCOUNTER
Spoke to patient and let her know that rx had been sent. She will try adding more fiber to her diet and if symptoms don't resolve, she will call back.

## 2022-11-23 NOTE — TELEPHONE ENCOUNTER
Spoke to patient and she has been on lisinopril for a week and has had diarrhea for the last 3 days. Please advise if it's possible the lisinopril is causing it and if she should stop.   Thanks

## 2022-11-30 DIAGNOSIS — Z78.0 MENOPAUSE: ICD-10-CM

## 2022-12-01 RX ORDER — ESTRADIOL 1 MG/1
TABLET ORAL
Qty: 90 TABLET | Refills: 0 | OUTPATIENT
Start: 2022-12-01

## 2022-12-11 DIAGNOSIS — Z78.0 MENOPAUSE: ICD-10-CM

## 2022-12-12 RX ORDER — ESTRADIOL 1 MG/1
TABLET ORAL
Qty: 90 TABLET | Refills: 0 | OUTPATIENT
Start: 2022-12-12

## 2022-12-16 ENCOUNTER — OFFICE VISIT (OUTPATIENT)
Dept: FAMILY MEDICINE CLINIC | Age: 67
End: 2022-12-16
Payer: MEDICARE

## 2022-12-16 ENCOUNTER — TELEPHONE (OUTPATIENT)
Dept: GYNECOLOGY | Age: 67
End: 2022-12-16

## 2022-12-16 VITALS
BODY MASS INDEX: 28.53 KG/M2 | OXYGEN SATURATION: 95 % | WEIGHT: 161 LBS | HEART RATE: 66 BPM | RESPIRATION RATE: 14 BRPM | TEMPERATURE: 98.3 F | SYSTOLIC BLOOD PRESSURE: 130 MMHG | DIASTOLIC BLOOD PRESSURE: 76 MMHG | HEIGHT: 63 IN

## 2022-12-16 DIAGNOSIS — E78.49 FAMILIAL MULTIPLE LIPOPROTEIN-TYPE HYPERLIPIDEMIA: ICD-10-CM

## 2022-12-16 DIAGNOSIS — I10 ESSENTIAL HYPERTENSION: ICD-10-CM

## 2022-12-16 DIAGNOSIS — Z23 NEED FOR PNEUMOCOCCAL VACCINATION: ICD-10-CM

## 2022-12-16 DIAGNOSIS — Z98.890 S/P COLONOSCOPY: ICD-10-CM

## 2022-12-16 DIAGNOSIS — Z00.00 INITIAL MEDICARE ANNUAL WELLNESS VISIT: Primary | ICD-10-CM

## 2022-12-16 PROCEDURE — 3078F DIAST BP <80 MM HG: CPT | Performed by: NURSE PRACTITIONER

## 2022-12-16 PROCEDURE — 90677 PCV20 VACCINE IM: CPT | Performed by: NURSE PRACTITIONER

## 2022-12-16 PROCEDURE — 1124F ACP DISCUSS-NO DSCNMKR DOCD: CPT | Performed by: NURSE PRACTITIONER

## 2022-12-16 PROCEDURE — 3074F SYST BP LT 130 MM HG: CPT | Performed by: NURSE PRACTITIONER

## 2022-12-16 PROCEDURE — G0438 PPPS, INITIAL VISIT: HCPCS | Performed by: NURSE PRACTITIONER

## 2022-12-16 PROCEDURE — G0009 ADMIN PNEUMOCOCCAL VACCINE: HCPCS | Performed by: NURSE PRACTITIONER

## 2022-12-16 ASSESSMENT — PATIENT HEALTH QUESTIONNAIRE - PHQ9
SUM OF ALL RESPONSES TO PHQ QUESTIONS 1-9: 0
SUM OF ALL RESPONSES TO PHQ QUESTIONS 1-9: 0
SUM OF ALL RESPONSES TO PHQ9 QUESTIONS 1 & 2: 0
2. FEELING DOWN, DEPRESSED OR HOPELESS: 0
SUM OF ALL RESPONSES TO PHQ QUESTIONS 1-9: 0
1. LITTLE INTEREST OR PLEASURE IN DOING THINGS: 0
SUM OF ALL RESPONSES TO PHQ QUESTIONS 1-9: 0

## 2022-12-16 ASSESSMENT — LIFESTYLE VARIABLES
HOW MANY STANDARD DRINKS CONTAINING ALCOHOL DO YOU HAVE ON A TYPICAL DAY: 1 OR 2
HOW OFTEN DO YOU HAVE A DRINK CONTAINING ALCOHOL: MONTHLY OR LESS

## 2022-12-16 NOTE — PROGRESS NOTES
Medicare Annual Wellness Visit    Leslie Torres is here for Medicare AWV (Would like to discuss blood pressure)    Assessment & Plan   Initial Medicare annual wellness visit  -     Comprehensive Metabolic Panel; Future  -     Lipid Panel; Future  -     TSH with Reflex; Future  Healthy lifestyles reviewed: diet, aerobic exercise, sunscreen, vision and dental exams. Declines flu vaccine and COVID-19 vaccine booster    Essential hypertension  -     Comprehensive Metabolic Panel; Future  BP has been controlled off medication. Advised to monitor BP at home multiple times per week. Should call if BP above 130/90. Familial multiple lipoprotein-type hyperlipidemia  -     Comprehensive Metabolic Panel; Future  -     Lipid Panel; Future  On crestor 5 mg daily. Was not able to tolerate higher dose due to muscle aches    Need for pneumococcal vaccination  -     Pneumococcal, PCV20, PREVNAR 20, (age 25 yrs+), IM, PF    S/P colonoscopy- 9/2021 recall 10 years with Dr. Hurt Notice       Recommendations for Preventive Services Due: see orders and patient instructions/AVS.  Recommended screening schedule for the next 5-10 years is provided to the patient in written form: see Patient Instructions/AVS.     Return in 6 months (on 6/16/2023), or if symptoms worsen or fail to improve, for chronic conditions. Subjective     Hypertension:  Home blood pressure monitoring: Yes - see below. She is adherent to a low sodium diet. Patient denies chest pain, shortness of breath, headache, lightheadedness, palpitations, and dry cough. Antihypertensive medication side effects: no medication side effects noted. Was on chlorthalidone and potassium supplement. Wanted to come off the potassium supplement so last visit switched to lisinopril, but caused severe diarrhea. Stopped medication end of Nov. Has been checking BP at home and has been running 106-125/52-74.     Hyperlipidemia:  No new myalgias or GI upset on rosuvastatin (Crestor). On crestor 5 mg daily as 10 mg daily caused too much muscle aches. No results found for: LABA1C  Lab Results   Component Value Date    CREATININE 0.85 09/12/2022     Lab Results   Component Value Date    ALT 11 09/12/2022    AST 16 09/12/2022     Lab Results   Component Value Date    HDL 63 09/12/2022    LDLCALC 78 09/12/2022        GERD- controlled on omeprazole 40 mg daily     Patient's complete Health Risk Assessment and screening values have been reviewed and are found in Flowsheets. The following problems were reviewed today and where indicated follow up appointments were made and/or referrals ordered. Positive Risk Factor Screenings with Interventions:                               Objective   Vitals:    12/16/22 1049   BP: 130/76   Site: Right Upper Arm   Position: Sitting   Cuff Size: Medium Adult   Pulse: 66   Resp: 14   Temp: 98.3 °F (36.8 °C)   TempSrc: Oral   SpO2: 95%   Weight: 161 lb (73 kg)   Height: 5' 3\" (1.6 m)      Body mass index is 28.52 kg/m². Physical Exam  Vitals and nursing note reviewed. Constitutional:       General: She is not in acute distress. Appearance: She is well-developed. HENT:      Head: Normocephalic and atraumatic. Cardiovascular:      Rate and Rhythm: Normal rate and regular rhythm. Heart sounds: Normal heart sounds. No murmur heard. No friction rub. No gallop. Pulmonary:      Effort: Pulmonary effort is normal. No respiratory distress. Breath sounds: Normal breath sounds. Musculoskeletal:      Cervical back: Neck supple. Right lower leg: No edema. Left lower leg: No edema. Skin:     General: Skin is warm and dry. Neurological:      Mental Status: She is alert and oriented to person, place, and time. Psychiatric:         Behavior: Behavior normal.         Thought Content:  Thought content normal.         Judgment: Judgment normal.          Allergies   Allergen Reactions    Latex Hives, Itching and Rash \"around the site\"      Codeine Hives    Ezetimibe Other (See Comments)     Muscle pain     Pravastatin Itching    Tetanus Toxoids Swelling     Swelling at injection site     Prior to Visit Medications    Medication Sig Taking? Authorizing Provider   DULoxetine (CYMBALTA) 30 MG extended release capsule Take 1 capsule by mouth daily Yes SHELIA Mccann CNP   rosuvastatin (CRESTOR) 10 MG tablet  Yes Historical Provider, MD   lisinopril (PRINIVIL;ZESTRIL) 10 MG tablet Take 1 tablet by mouth daily For high blood pressure.  Yes SHELIA Mccann CNP   estradiol (ESTRACE) 1 MG tablet TAKE ONE TABLET BY MOUTH DAILY Yes Darinel Allen MD   Cholecalciferol (VITAMIN D3) 50 MCG (2000 UT) CAPS Take by mouth daily Yes Historical Provider, MD   Meloxicam 15 MG TBDP Take by mouth as needed Yes Historical Provider, MD   Fluticasone Propionate (FLONASE NA) by Nasal route as needed Yes Historical Provider, MD   loratadine (CLARITIN) 10 MG tablet Take 10 mg by mouth daily as needed Yes Historical Provider, MD   omeprazole (PRILOSEC) 40 MG capsule Take 40 mg by mouth daily  Yes Historical Provider, MD Sebastian (Including outside providers/suppliers regularly involved in providing care):   Patient Care Team:  SHELIA Mccann CNP as PCP - General (Family Nurse Practitioner)  SHELIA Mccann CNP as PCP - [facility] Empaneled Provider  Anu Gary MD as Surgeon (General Surgery)     Reviewed and updated this visit:  Tobacco  Allergies  Meds  Med Hx  Surg Hx  Soc Hx  Fam Hx

## 2022-12-16 NOTE — PATIENT INSTRUCTIONS
Advance Directives: Care Instructions  Overview  An advance directive is a legal way to state your wishes at the end of your life. It tells your family and your doctor what to do if you can't say what you want. There are two main types of advance directives. You can change them any time your wishes change. Living will. This form tells your family and your doctor your wishes about life support and other treatment. The form is also called a declaration. Medical power of . This form lets you name a person to make treatment decisions for you when you can't speak for yourself. This person is called a health care agent (health care proxy, health care surrogate). The form is also called a durable power of  for health care. If you do not have an advance directive, decisions about your medical care may be made by a family member, or by a doctor or a  who doesn't know you. It may help to think of an advance directive as a gift to the people who care for you. If you have one, they won't have to make tough decisions by themselves. For more information, including forms for your state, see the [address] website (www.caringinfo.org/planning/advance-directives/). Follow-up care is a key part of your treatment and safety. Be sure to make and go to all appointments, and call your doctor if you are having problems. It's also a good idea to know your test results and keep a list of the medicines you take. What should you include in an advance directive? Many states have a unique advance directive form. (It may ask you to address specific issues.) Or you might use a universal form that's approved by many states. If your form doesn't tell you what to address, it may be hard to know what to include in your advance directive. Use the questions below to help you get started. Who do you want to make decisions about your medical care if you are not able to?   What life-support measures do you want if you have a serious illness that gets worse over time or can't be cured? What are you most afraid of that might happen? (Maybe you're afraid of having pain, losing your independence, or being kept alive by machines.)  Where would you prefer to die? (Your home? A hospital? A nursing home?)  Do you want to donate your organs when you die? Do you want certain Quaker practices performed before you die? When should you call for help? Be sure to contact your doctor if you have any questions. Where can you learn more? Go to http://www.reina.com/ and enter R264 to learn more about \"Advance Directives: Care Instructions. \"  Current as of: June 16, 2022               Content Version: 13.5  © 9479-0505 Healthwise, Incorporated. Care instructions adapted under license by [facility] ([facility]). If you have questions about a medical condition or this instruction, always ask your healthcare professional. [place] [address] any warranty or liability for your use of this information. Personalized Preventive Plan for Ruslan Sellers - 12/16/2022  Medicare offers a range of preventive health benefits. Some of the tests and screenings are paid in full while other may be subject to a deductible, co-insurance, and/or copay. Some of these benefits include a comprehensive review of your medical history including lifestyle, illnesses that may run in your family, and various assessments and screenings as appropriate. After reviewing your medical record and screening and assessments performed today your provider may have ordered immunizations, labs, imaging, and/or referrals for you. A list of these orders (if applicable) as well as your Preventive Care list are included within your After Visit Summary for your review.     Other Preventive Recommendations:    A preventive eye exam performed by an eye specialist is recommended every 1-2 years to screen for glaucoma; cataracts, macular degeneration, and other eye disorders. A preventive dental visit is recommended every 6 months. Try to get at least 150 minutes of exercise per week or 10,000 steps per day on a pedometer . Order or download the FREE \"Exercise & Physical Activity: Your Everyday Guide\" from [employer]. Call 4-891.160.1939 or search [employer] online. You need 1199-3433 mg of calcium and 7406-2467 IU of vitamin D per day. It is possible to meet your calcium requirement with diet alone, but a vitamin D supplement is usually necessary to meet this goal.  When exposed to the sun, use a sunscreen that protects against both UVA and UVB radiation with an SPF of 30 or greater. Reapply every 2 to 3 hours or after sweating, drying off with a towel, or swimming. Always wear a seat belt when traveling in a car. Always wear a helmet when riding a bicycle or motorcycle.

## 2022-12-16 NOTE — TELEPHONE ENCOUNTER
Patient called into office, she says she needs a refill on her Estradiol medication. Her script can be sent to [facility] [phone] [facility], [address] [place] [address] Faisal Mendiola.  [facility] [phone] Jose Maria Curtis [phone]    Patient can be contacted at [phone]

## 2022-12-17 NOTE — TELEPHONE ENCOUNTER
Call patient and tell her that I have not seen her in over 3 years. Once she gets on our schedule for an annual this winter, then I can call her prescription in.

## 2022-12-19 DIAGNOSIS — E78.49 FAMILIAL MULTIPLE LIPOPROTEIN-TYPE HYPERLIPIDEMIA: ICD-10-CM

## 2022-12-19 DIAGNOSIS — Z78.0 MENOPAUSE: ICD-10-CM

## 2022-12-19 DIAGNOSIS — I10 ESSENTIAL HYPERTENSION: ICD-10-CM

## 2022-12-19 DIAGNOSIS — Z00.00 INITIAL MEDICARE ANNUAL WELLNESS VISIT: ICD-10-CM

## 2022-12-19 LAB
A/G RATIO: 2.4 (ref 1.1–2.2)
ALBUMIN SERPL-MCNC: 4.3 G/DL (ref 3.4–5)
ALP BLD-CCNC: 71 U/L (ref 40–129)
ALT SERPL-CCNC: 13 U/L (ref 10–40)
ANION GAP SERPL CALCULATED.3IONS-SCNC: 9 MMOL/L (ref 3–16)
AST SERPL-CCNC: 19 U/L (ref 15–37)
BILIRUB SERPL-MCNC: 0.5 MG/DL (ref 0–1)
BUN BLDV-MCNC: 15 MG/DL (ref 7–20)
CALCIUM SERPL-MCNC: 10.2 MG/DL (ref 8.3–10.6)
CHLORIDE BLD-SCNC: 101 MMOL/L (ref 99–110)
CHOLESTEROL, TOTAL: 159 MG/DL (ref 0–199)
CO2: 29 MMOL/L (ref 21–32)
CREAT SERPL-MCNC: 0.8 MG/DL (ref 0.6–1.2)
GFR SERPL CREATININE-BSD FRML MDRD: >60 ML/MIN/{1.73_M2}
GLUCOSE BLD-MCNC: 99 MG/DL (ref 70–99)
HDLC SERPL-MCNC: 64 MG/DL (ref 40–60)
LDL CHOLESTEROL CALCULATED: 82 MG/DL
POTASSIUM SERPL-SCNC: 5 MMOL/L (ref 3.5–5.1)
SODIUM BLD-SCNC: 139 MMOL/L (ref 136–145)
TOTAL PROTEIN: 6.1 G/DL (ref 6.4–8.2)
TRIGL SERPL-MCNC: 65 MG/DL (ref 0–150)
TSH REFLEX: 3.53 UIU/ML (ref 0.27–4.2)
VLDLC SERPL CALC-MCNC: 13 MG/DL

## 2022-12-19 RX ORDER — ESTRADIOL 1 MG/1
1 TABLET ORAL DAILY
Qty: 90 TABLET | Refills: 0 | Status: SHIPPED | OUTPATIENT
Start: 2022-12-19

## 2022-12-19 RX ORDER — ESTRADIOL 1 MG/1
TABLET ORAL
Qty: 90 TABLET | Refills: 0 | OUTPATIENT
Start: 2022-12-19

## 2023-01-09 ENCOUNTER — OFFICE VISIT (OUTPATIENT)
Dept: GYNECOLOGY | Age: 68
End: 2023-01-09
Payer: MEDICARE

## 2023-01-09 VITALS
HEART RATE: 74 BPM | HEIGHT: 63 IN | BODY MASS INDEX: 29.13 KG/M2 | DIASTOLIC BLOOD PRESSURE: 77 MMHG | RESPIRATION RATE: 17 BRPM | OXYGEN SATURATION: 97 % | WEIGHT: 164.4 LBS | SYSTOLIC BLOOD PRESSURE: 128 MMHG

## 2023-01-09 DIAGNOSIS — Z78.0 MENOPAUSE: ICD-10-CM

## 2023-01-09 DIAGNOSIS — Z01.419 WELL WOMAN EXAM WITH ROUTINE GYNECOLOGICAL EXAM: Primary | ICD-10-CM

## 2023-01-09 DIAGNOSIS — N81.10 VAGINAL PROLAPSE: ICD-10-CM

## 2023-01-09 PROCEDURE — 3074F SYST BP LT 130 MM HG: CPT | Performed by: OBSTETRICS & GYNECOLOGY

## 2023-01-09 PROCEDURE — 3078F DIAST BP <80 MM HG: CPT | Performed by: OBSTETRICS & GYNECOLOGY

## 2023-01-09 PROCEDURE — 99397 PER PM REEVAL EST PAT 65+ YR: CPT | Performed by: OBSTETRICS & GYNECOLOGY

## 2023-01-11 RX ORDER — ESTRADIOL 1 MG/1
1 TABLET ORAL DAILY
Qty: 90 TABLET | Refills: 3 | Status: SHIPPED | OUTPATIENT
Start: 2023-01-11

## 2023-01-11 ASSESSMENT — ENCOUNTER SYMPTOMS
GASTROINTESTINAL NEGATIVE: 1
ALLERGIC/IMMUNOLOGIC NEGATIVE: 1
EYES NEGATIVE: 1
RESPIRATORY NEGATIVE: 1

## 2023-01-12 NOTE — PROGRESS NOTES
Subjective:      Patient ID: Bia Chan is a 75 y.o. female. Patient is here for annual. Patient in menopause. Gynecologic Exam      Review of Systems   Constitutional: Negative. HENT: Negative. Eyes: Negative. Respiratory: Negative. Cardiovascular: Negative. Gastrointestinal: Negative. Endocrine: Negative. Genitourinary: Negative. Musculoskeletal: Negative. Skin: Negative. Allergic/Immunologic: Negative. Neurological: Negative. Hematological: Negative. Psychiatric/Behavioral: Negative.        Date of Birth 1955  Past Medical History:   Diagnosis Date    DDD (degenerative disc disease), lumbar 02/05/2019    Thoracic spine pain    Hemorrhoids, external     HIGH CHOLESTEROL     Hypertension     IBS (irritable bowel syndrome)     Right lumbar radiculitis 02/05/2019    Vaginal wall prolapse     Wears glasses      Past Surgical History:   Procedure Laterality Date    BREAST BIOPSY Left 8/4/2022    RADIOFREQUENCY IDENTIFIED LOCALIZED LEFT BREAST EXCISIONAL BIOPSY performed by Fauzia Valverde MD at [facility]  2009    CYSTOSCOPY N/A 3/21/2022    CYSTOSCOPY INJECTION OF URETHRAL BULKING AGENT- BULKAMID performed by Emiliano Chapin MD at [address] (CERVIX STATUS UNKNOWN)      prolapsed uterus, still with ovaries    [facility] BREAST BIOPSY LEFT Left 6/20/2022    [facility] BREAST BIOPSY LEFT 6/20/2022 [facility] [address] [facility] [address] LUMB FACET LEVEL 1 BILATERAL Right 3/11/2019    RIGHT L4/L5 FACET CYST ASPIRATE WITH FLUOROSCOPY ([id number]) performed by Antonina Guerrero MD at [address] [facility] Right 12/16/2019    RIGHT L4/5 LUMBAR FACET CYST ASPIRATION WITH FLUOROSCOPY performed by Antonina Guerrero MD at [address] [facility] N/A 3/11/2020    CAUDAL LUMBAR EPIDURAL STEROID INJECTION performed by Antonina Guerrero MD at [address] PROCEDURE N/A 2020    CAUDAL LUMBAR EPIDURAL STEROID INJECTION WITH FLUOROSCOPY performed by Jeromy Avery MD at [address] N/A 2020    AND COCCYGEAL JOINT INJECTION WITH FLUOROSCOPY performed by Jeromy Avery MD at [address] Right 2020    RIGHT L4 AND L5 TRANSFORAMINAL EPIDURAL STEROID INJECTION WITH FLUOROSCOPY performed by Jeromy Avery MD at [address]       OB History    Para Term  AB Living   2 2 2     2   SAB IAB Ectopic Molar Multiple Live Births             2      # Outcome Date GA Lbr Santiago/2nd Weight Sex Delivery Anes PTL Lv   2 Term 82 40w0d   M    JIMENEZ   1 Term 76 40w0d   M    JIMENEZ     Social History     Socioeconomic History    Marital status:      Spouse name: Chadd Parkinson    Number of children: 2    Years of education: Not on file    Highest education level: Not on file   Occupational History    Not on file   Tobacco Use    Smoking status: Former     Packs/day: 0.50     Years: 20.00     Pack years: 10.00     Types: Cigarettes     Quit date:      Years since quittin.0    Smokeless tobacco: Never   Vaping Use    Vaping Use: Never used   Substance and Sexual Activity    Alcohol use:  Yes     Alcohol/week: 0.0 standard drinks     Comment: Occasions    Drug use: Never    Sexual activity: Yes     Partners: Male   Other Topics Concern    Not on file   Social History Narrative    Not on file     Social Determinants of Health     Financial Resource Strain: Low Risk     Difficulty of Paying Living Expenses: Not hard at all   Food Insecurity: No Food Insecurity    Worried About Running Out of Food in the Last Year: Never true    Ran Out of Food in the Last Year: Never true   Transportation Needs: Not on file   Physical Activity: Insufficiently Active    Days of Exercise per Week: 3 days    Minutes of Exercise per Session: 30 min   Stress: Not on file   Social Connections: Not on file Intimate Partner Violence: Not on file   Housing Stability: Not on file     Allergies   Allergen Reactions    Latex Hives, Itching and Rash     \"around the site\"      Codeine Hives    Ezetimibe Other (See Comments)     Muscle pain     Pravastatin Itching    Tetanus Toxoids Swelling     Swelling at injection site     Outpatient Medications Marked as Taking for the 1/9/23 encounter (Office Visit) with Hilario Caraballo MD   Medication Sig Dispense Refill    estradiol (ESTRACE) 1 MG tablet Take 1 tablet by mouth daily 90 tablet 0    DULoxetine (CYMBALTA) 30 MG extended release capsule Take 1 capsule by mouth daily 30 capsule 2    rosuvastatin (CRESTOR) 10 MG tablet       Cholecalciferol (VITAMIN D3) 50 MCG (2000 UT) CAPS Take by mouth daily      Meloxicam 15 MG TBDP Take by mouth as needed      Fluticasone Propionate (FLONASE NA) by Nasal route as needed      loratadine (CLARITIN) 10 MG tablet Take 10 mg by mouth daily as needed      omeprazole (PRILOSEC) 40 MG capsule Take 40 mg by mouth daily        Family History   Problem Relation Age of Onset    Lung Cancer Mother     COPD Father     Diabetes Maternal Grandmother     Uterine Cancer Maternal Grandmother     Diabetes Paternal Grandmother     Muscular Dystrophy Son     Breast Cancer Maternal Aunt     Colon Cancer Maternal Aunt     Rheum Arthritis Neg Hx     Osteoarthritis Neg Hx     Asthma Neg Hx     Heart Failure Neg Hx     High Cholesterol Neg Hx     Hypertension Neg Hx     Migraines Neg Hx     Ovarian Cancer Neg Hx     Rashes/Skin Problems Neg Hx     Seizures Neg Hx     Stroke Neg Hx     Thyroid Disease Neg Hx      /77 (Site: Right Upper Arm, Position: Sitting, Cuff Size: Large Adult)   Pulse 74   Resp 17   Ht 5' 3\" (1.6 m)   Wt 164 lb 6.4 oz (74.6 kg)   SpO2 97%   BMI 29.12 kg/m²       Objective:   Physical Exam  Constitutional:       General: She is not in acute distress. Appearance: Normal appearance. She is well-developed and normal weight. She is not diaphoretic. HENT:      Head: Normocephalic. Nose: Nose normal.      Mouth/Throat:      Mouth: Mucous membranes are moist.      Pharynx: Oropharynx is clear. Eyes:      Extraocular Movements: Extraocular movements intact. Neck:      Thyroid: No thyromegaly. Cardiovascular:      Rate and Rhythm: Normal rate and regular rhythm. Heart sounds: Normal heart sounds. No murmur heard. No friction rub. No gallop. Pulmonary:      Effort: Pulmonary effort is normal. No respiratory distress. Breath sounds: Normal breath sounds. No wheezing or rales. Chest:      Chest wall: No tenderness. Breasts:     Right: No swelling, bleeding, inverted nipple, mass, nipple discharge, skin change or tenderness. Left: No swelling, bleeding, inverted nipple, mass, nipple discharge, skin change or tenderness. Abdominal:      General: Abdomen is flat. There is no distension. Palpations: Abdomen is soft. There is no hepatomegaly or mass. Tenderness: There is no abdominal tenderness. There is no guarding or rebound. Hernia: No hernia is present. There is no hernia in the left inguinal area. Genitourinary:     Exam position: Lithotomy position. Labia:         Right: No rash, tenderness, lesion or injury. Left: No rash, tenderness, lesion or injury. Urethra: No prolapse, urethral pain, urethral swelling or urethral lesion. Vagina: Normal. No signs of injury and foreign body. No vaginal discharge, erythema, tenderness, bleeding or lesions. Adnexa: Right adnexa normal and left adnexa normal.        Right: No mass, tenderness or fullness. Left: No mass, tenderness or fullness. Rectum: Normal. Guaiac result negative. No mass, tenderness, anal fissure, external hemorrhoid or internal hemorrhoid. Normal anal tone. Comments: Normal urethral meatus, nl urethra, cystocele  Musculoskeletal:         General: No swelling or tenderness.  Normal range of motion. Cervical back: Normal range of motion and neck supple. No rigidity. Lymphadenopathy:      Cervical: No cervical adenopathy. Skin:     General: Skin is warm and dry. Coloration: Skin is not jaundiced or pale. Findings: No bruising, erythema, lesion or rash. Neurological:      General: No focal deficit present. Mental Status: She is alert and oriented to person, place, and time. Mental status is at baseline. Deep Tendon Reflexes: Reflexes are normal and symmetric. Psychiatric:         Mood and Affect: Mood normal.         Behavior: Behavior normal.         Thought Content:  Thought content normal.         Judgment: Judgment normal.       Assessment:      Annual  Menopause  Vaginal wall prolapse  Plan:      Pap, calcium, exercise, mammogram, hemocult negative  Stable-refill estrace  Kegels-referral to pelvic floor PT if wants to help with prolapse        Tasha Ramirez MD

## 2023-02-21 RX ORDER — DULOXETIN HYDROCHLORIDE 30 MG/1
CAPSULE, DELAYED RELEASE ORAL
Qty: 30 CAPSULE | Refills: 3 | Status: SHIPPED | OUTPATIENT
Start: 2023-02-21

## 2023-06-12 ENCOUNTER — TELEPHONE (OUTPATIENT)
Dept: FAMILY MEDICINE CLINIC | Age: 68
End: 2023-06-12

## 2023-06-12 DIAGNOSIS — I10 ESSENTIAL HYPERTENSION: Primary | ICD-10-CM

## 2023-06-12 NOTE — TELEPHONE ENCOUNTER
----- Message from Lj Trinidad sent at 6/12/2023  9:59 AM EDT -----  Subject: Referral Request    Reason for referral request? Labs   Provider patient wants to be referred to(if known):     Provider Phone Number(if known): Additional Information for Provider? Terri Calderón is requesting orders   for labs before her visit on 6/16/23.  Please f/u with her as soon as   possible.   ---------------------------------------------------------------------------  --------------  Paras Can INFO    [phone]; OK to leave message on voicemail,OK to respond with electronic   message via [employer] portal (only for patients who have registered [employer]   account)  ---------------------------------------------------------------------------  --------------

## 2023-06-14 DIAGNOSIS — I10 ESSENTIAL HYPERTENSION: ICD-10-CM

## 2023-06-14 LAB
ALBUMIN SERPL-MCNC: 4.2 G/DL (ref 3.4–5)
ALBUMIN/GLOB SERPL: 1.8 {RATIO} (ref 1.1–2.2)
ALP SERPL-CCNC: 66 U/L (ref 40–129)
ALT SERPL-CCNC: 12 U/L (ref 10–40)
ANION GAP SERPL CALCULATED.3IONS-SCNC: 12 MMOL/L (ref 3–16)
AST SERPL-CCNC: 12 U/L (ref 15–37)
BILIRUB SERPL-MCNC: <0.2 MG/DL (ref 0–1)
BUN SERPL-MCNC: 12 MG/DL (ref 7–20)
CALCIUM SERPL-MCNC: 9.5 MG/DL (ref 8.3–10.6)
CHLORIDE SERPL-SCNC: 104 MMOL/L (ref 99–110)
CO2 SERPL-SCNC: 25 MMOL/L (ref 21–32)
CREAT SERPL-MCNC: 0.8 MG/DL (ref 0.6–1.2)
GFR SERPLBLD CREATININE-BSD FMLA CKD-EPI: >60 ML/MIN/{1.73_M2}
GLUCOSE SERPL-MCNC: 119 MG/DL (ref 70–99)
POTASSIUM SERPL-SCNC: 4.4 MMOL/L (ref 3.5–5.1)
PROT SERPL-MCNC: 6.5 G/DL (ref 6.4–8.2)
SODIUM SERPL-SCNC: 141 MMOL/L (ref 136–145)

## 2023-06-16 DIAGNOSIS — R25.2 MUSCLE CRAMPS: ICD-10-CM

## 2023-06-16 LAB — MAGNESIUM SERPL-MCNC: 1.5 MG/DL (ref 1.8–2.4)

## 2023-07-17 ENCOUNTER — OFFICE VISIT (OUTPATIENT)
Dept: FAMILY MEDICINE CLINIC | Age: 68
End: 2023-07-17

## 2023-07-17 VITALS
TEMPERATURE: 98.7 F | RESPIRATION RATE: 16 BRPM | BODY MASS INDEX: 28.77 KG/M2 | WEIGHT: 162.4 LBS | OXYGEN SATURATION: 96 % | SYSTOLIC BLOOD PRESSURE: 124 MMHG | DIASTOLIC BLOOD PRESSURE: 82 MMHG | HEIGHT: 63 IN | HEART RATE: 72 BPM

## 2023-07-17 DIAGNOSIS — E83.42 HYPOMAGNESEMIA: ICD-10-CM

## 2023-07-17 DIAGNOSIS — F41.9 ANXIETY: ICD-10-CM

## 2023-07-17 DIAGNOSIS — I10 ESSENTIAL HYPERTENSION: ICD-10-CM

## 2023-07-17 DIAGNOSIS — I10 ESSENTIAL HYPERTENSION: Primary | ICD-10-CM

## 2023-07-17 DIAGNOSIS — E78.49 FAMILIAL MULTIPLE LIPOPROTEIN-TYPE HYPERLIPIDEMIA: ICD-10-CM

## 2023-07-17 DIAGNOSIS — K21.9 GASTROESOPHAGEAL REFLUX DISEASE WITHOUT ESOPHAGITIS: ICD-10-CM

## 2023-07-17 LAB
ANION GAP SERPL CALCULATED.3IONS-SCNC: 8 MMOL/L (ref 3–16)
BUN SERPL-MCNC: 12 MG/DL (ref 7–20)
CALCIUM SERPL-MCNC: 9.8 MG/DL (ref 8.3–10.6)
CHLORIDE SERPL-SCNC: 104 MMOL/L (ref 99–110)
CO2 SERPL-SCNC: 29 MMOL/L (ref 21–32)
CREAT SERPL-MCNC: 0.9 MG/DL (ref 0.6–1.2)
GFR SERPLBLD CREATININE-BSD FMLA CKD-EPI: >60 ML/MIN/{1.73_M2}
GLUCOSE SERPL-MCNC: 80 MG/DL (ref 70–99)
MAGNESIUM SERPL-MCNC: 1.6 MG/DL (ref 1.8–2.4)
POTASSIUM SERPL-SCNC: 4.4 MMOL/L (ref 3.5–5.1)
SODIUM SERPL-SCNC: 141 MMOL/L (ref 136–145)

## 2023-07-17 ASSESSMENT — ENCOUNTER SYMPTOMS
BACK PAIN: 1
VOMITING: 0
COUGH: 0
CONSTIPATION: 0
DIARRHEA: 0
ABDOMINAL PAIN: 0
SHORTNESS OF BREATH: 0

## 2023-07-17 NOTE — PROGRESS NOTES
7/17/2023    This is a 75 y.o. female   Chief Complaint   Patient presents with    Hypertension     Bp was too low on 25 mg of losartan. Has been cutting in half and bp has been good. Stopped taking magnesium because of intolerance. Has had a headache every day for the last couple weeks. Discuss Labs     Would like to discuss lab results. [facility]  Patient reports that she will get cramping in her fingers and legs. Happens almost daily. She tried to take magnesium supplement but that caused diarrhea so she stopped the medication. Has been trying to eat more magnesium rich foods. Hypertension:  Home blood pressure monitoring: Yes - 120/60s. She is adherent to a low sodium diet. Patient denies chest pain, shortness of breath, headache, lightheadedness, peripheral edema, palpitations, and dry cough. Antihypertensive medication side effects: no medication side effects noted. On losartan 12.5 mg daily. Reports that when she was taking the losartan 25 mg daily it caused low BP and that is why she dropped down to the 12.5 mg dose. She has not had low BP with the 12.5 mg dose. Previous was on chlorthalidone and had to take potassium supplement with medication. Then tried lisinopril, but that caused severe diarrhea so had to stop medication. Hyperlipidemia:  No new myalgias or GI upset on rosuvastatin (Crestor). No results found for: LABA1C  Lab Results   Component Value Date    CREATININE 0.8 06/14/2023     Lab Results   Component Value Date    ALT 12 06/14/2023    AST 12 (L) 06/14/2023     Lab Results   Component Value Date    CHOL 159 12/19/2022    TRIG 65 12/19/2022    HDL 64 (H) 12/19/2022    LDLCALC 82 12/19/2022        GERD- controlled on omeprazole 40 mg daily. Has not been able to come off medication in past due to recurrent GERD. Anxiety- on cymbalta 30 mg daily   Feels that anxiety is controlled on medication. Son passed away July 2022. Denies depression.       GYN-

## 2023-08-13 DIAGNOSIS — I10 ESSENTIAL HYPERTENSION: ICD-10-CM

## 2023-08-14 RX ORDER — LOSARTAN POTASSIUM 25 MG/1
12.5 TABLET ORAL DAILY
Qty: 45 TABLET | Refills: 1 | Status: SHIPPED | OUTPATIENT
Start: 2023-08-14

## 2023-08-28 ENCOUNTER — TELEPHONE (OUTPATIENT)
Dept: PHARMACY | Facility: CLINIC | Age: 68
End: 2023-08-28

## 2023-08-28 NOTE — TELEPHONE ENCOUNTER
[facility] CLINICAL PHARMACY: ADHERENCE REVIEW  Identified care gap per [employer]: fills at [place]: ACE/ARB and Statin adherence    Patient also appears to be prescribed: ACE/ARB and Statin    ASSESSMENT    ACE/ARB ADHERENCE    Insurance Records claims through 23 (Prior Year [address] = not reported; YTD  [address] = 100%; Potential Fail Date: 23):   LOSARTAN POT TAB 25MG last filled on 23 for 30 day supply. Next refill due: 8/15/23    Prescribed si tablet/capsule daily    Per Insurer Portal: last filled on 23 for 90 day supply- dose change. This fill is written for losartan 25 mg 1/2 tablet daily    BP Readings from Last 3 Encounters:   23 124/82   23 120/78   23 128/77     Estimated Creatinine Clearance: 57 mL/min (based on SCr of 0.9 mg/dL). Lab Results   Component Value Date    CREATININE 0.9 2023     Lab Results   Component Value Date    K 4.4 2023     STATIN ADHERENCE    Insurance Records claims through 23 (Prior Year PDC = 100% - PASSED; YTD PDC = 81%; Potential Fail Date: 23):   ROSUVASTATIN TAB 10MG last filled on 23 for 90 day supply.  Next refill due: 7/3/23    Prescribed si tablet/capsule daily    But med list states rosuvastatin 10 mg- 1/2 tab daily    Lab Results   Component Value Date    CHOL 159 2022    TRIG 65 2022    HDL 64 (H) 2022    LDLCALC 82 2022     ALT   Date Value Ref Range Status   2023 12 10 - 40 U/L Final     AST   Date Value Ref Range Status   2023 12 (L) 15 - 37 U/L Final     The 10-year ASCVD risk score (Pooja ELLIS, et al., 2019) is: 9.5%    Values used to calculate the score:      Age: 70 years      Sex: Female      Is Non- : No      Diabetic: No      Tobacco smoker: No      Systolic Blood Pressure: 361 mmHg      Is BP treated: Yes      HDL Cholesterol: 64 mg/dL      Total Cholesterol: 159 mg/dL     PLAN  Per insurer report, LIS-0 - co-pays are based on tiers and

## 2023-09-12 RX ORDER — ROSUVASTATIN CALCIUM 5 MG/1
5 TABLET, COATED ORAL DAILY
Qty: 100 TABLET | Refills: 1 | Status: SHIPPED | OUTPATIENT
Start: 2023-09-12

## 2023-09-12 NOTE — TELEPHONE ENCOUNTER
Noted rosuvastatin 5mg rx for 100ds approved, thank you!     MyChart message sent to patient.    =======================================================   For Pharmacy Admin Tracking Only    Program: Doris in place:  No  Recommendation Provided To: Provider: 1 via Note to Provider  Intervention Detail: New Rx: 1, reason: Improve Adherence  Intervention Accepted By: Provider: 1  Gap Closed?: No   Time Spent (min): 10

## 2023-09-12 NOTE — TELEPHONE ENCOUNTER
Lori Whyte, APRN - CNP, can we update rx to 5mg tablets so patient does not have to split? And, patient eligible for up to 100-day supply, if appropriate. Rx(s) pended for your signature/modification as appropriate    Last Visit: 7/17/23  Next Visit: 1/17/24    Thank you,  Su Alejandra, PharmD, [address] [place] [address], toll free: [phone], option 1     =======================================================   Marked for f/u from 8/28/23 telephone encounter re needs new script - as per encounter, pt taking 1/2 of rosuvastatin 10mg tablet and provider aware. Patient agreeable/prefers rosuvastatin 5mg tab and 100ds - will pend to PCP.

## 2023-11-17 ENCOUNTER — HOSPITAL ENCOUNTER (OUTPATIENT)
Dept: WOMENS IMAGING | Age: 68
Discharge: HOME | End: 2023-11-17
Payer: MEDICARE

## 2023-11-17 VITALS — WEIGHT: 160 LBS | HEIGHT: 63 IN | BODY MASS INDEX: 28.35 KG/M2

## 2023-11-17 DIAGNOSIS — Z12.31 VISIT FOR SCREENING MAMMOGRAM: ICD-10-CM

## 2023-11-17 PROCEDURE — 77063 BREAST TOMOSYNTHESIS BI: CPT

## 2023-12-11 DIAGNOSIS — M48.061 SPINAL STENOSIS, LUMBAR REGION WITHOUT NEUROGENIC CLAUDICATION: ICD-10-CM

## 2023-12-11 RX ORDER — DULOXETIN HYDROCHLORIDE 30 MG/1
30 CAPSULE, DELAYED RELEASE ORAL DAILY
Qty: 90 CAPSULE | Refills: 1 | Status: SHIPPED | OUTPATIENT
Start: 2023-12-11

## 2024-01-22 ENCOUNTER — OFFICE VISIT (OUTPATIENT)
Dept: FAMILY MEDICINE CLINIC | Age: 69
End: 2024-01-22
Payer: MEDICARE

## 2024-01-22 VITALS
DIASTOLIC BLOOD PRESSURE: 84 MMHG | TEMPERATURE: 98.4 F | SYSTOLIC BLOOD PRESSURE: 124 MMHG | HEART RATE: 65 BPM | RESPIRATION RATE: 18 BRPM | OXYGEN SATURATION: 99 % | HEIGHT: 63 IN | BODY MASS INDEX: 29.7 KG/M2 | WEIGHT: 167.6 LBS

## 2024-01-22 DIAGNOSIS — E83.42 HYPOMAGNESEMIA: ICD-10-CM

## 2024-01-22 DIAGNOSIS — E78.49 FAMILIAL MULTIPLE LIPOPROTEIN-TYPE HYPERLIPIDEMIA: ICD-10-CM

## 2024-01-22 DIAGNOSIS — I10 ESSENTIAL HYPERTENSION: ICD-10-CM

## 2024-01-22 DIAGNOSIS — Z78.0 POST-MENOPAUSAL: ICD-10-CM

## 2024-01-22 DIAGNOSIS — M48.061 SPINAL STENOSIS, LUMBAR REGION WITHOUT NEUROGENIC CLAUDICATION: ICD-10-CM

## 2024-01-22 DIAGNOSIS — Z00.00 MEDICARE ANNUAL WELLNESS VISIT, SUBSEQUENT: ICD-10-CM

## 2024-01-22 DIAGNOSIS — K21.9 GASTROESOPHAGEAL REFLUX DISEASE WITHOUT ESOPHAGITIS: ICD-10-CM

## 2024-01-22 DIAGNOSIS — Z00.00 MEDICARE ANNUAL WELLNESS VISIT, SUBSEQUENT: Primary | ICD-10-CM

## 2024-01-22 DIAGNOSIS — F41.9 ANXIETY: ICD-10-CM

## 2024-01-22 DIAGNOSIS — Z13.820 OSTEOPOROSIS SCREENING: ICD-10-CM

## 2024-01-22 LAB
ALBUMIN SERPL-MCNC: 4.4 G/DL (ref 3.4–5)
ALBUMIN/GLOB SERPL: 2.2 {RATIO} (ref 1.1–2.2)
ALP SERPL-CCNC: 73 U/L (ref 40–129)
ALT SERPL-CCNC: 15 U/L (ref 10–40)
ANION GAP SERPL CALCULATED.3IONS-SCNC: 13 MMOL/L (ref 3–16)
AST SERPL-CCNC: 18 U/L (ref 15–37)
BASOPHILS # BLD: 0.1 K/UL (ref 0–0.2)
BASOPHILS NFR BLD: 0.8 %
BILIRUB SERPL-MCNC: 0.4 MG/DL (ref 0–1)
BUN SERPL-MCNC: 10 MG/DL (ref 7–20)
CALCIUM SERPL-MCNC: 9.4 MG/DL (ref 8.3–10.6)
CHLORIDE SERPL-SCNC: 98 MMOL/L (ref 99–110)
CHOLEST SERPL-MCNC: 169 MG/DL (ref 0–199)
CO2 SERPL-SCNC: 24 MMOL/L (ref 21–32)
CREAT SERPL-MCNC: 0.8 MG/DL (ref 0.6–1.2)
DEPRECATED RDW RBC AUTO: 12.5 % (ref 12.4–15.4)
EOSINOPHIL # BLD: 0.2 K/UL (ref 0–0.6)
EOSINOPHIL NFR BLD: 2.4 %
GFR SERPLBLD CREATININE-BSD FMLA CKD-EPI: >60 ML/MIN/{1.73_M2}
GLUCOSE SERPL-MCNC: 97 MG/DL (ref 70–99)
HCT VFR BLD AUTO: 39 % (ref 36–48)
HDLC SERPL-MCNC: 66 MG/DL (ref 40–60)
HGB BLD-MCNC: 13.2 G/DL (ref 12–16)
LDLC SERPL CALC-MCNC: 81 MG/DL
LYMPHOCYTES # BLD: 2.1 K/UL (ref 1–5.1)
LYMPHOCYTES NFR BLD: 33.7 %
MAGNESIUM SERPL-MCNC: 1.6 MG/DL (ref 1.8–2.4)
MCH RBC QN AUTO: 30.9 PG (ref 26–34)
MCHC RBC AUTO-ENTMCNC: 33.9 G/DL (ref 31–36)
MCV RBC AUTO: 91 FL (ref 80–100)
MONOCYTES # BLD: 0.5 K/UL (ref 0–1.3)
MONOCYTES NFR BLD: 7.8 %
NEUTROPHILS # BLD: 3.5 K/UL (ref 1.7–7.7)
NEUTROPHILS NFR BLD: 55.3 %
PLATELET # BLD AUTO: 291 K/UL (ref 135–450)
PMV BLD AUTO: 9.9 FL (ref 5–10.5)
POTASSIUM SERPL-SCNC: 4.5 MMOL/L (ref 3.5–5.1)
PROT SERPL-MCNC: 6.4 G/DL (ref 6.4–8.2)
RBC # BLD AUTO: 4.29 M/UL (ref 4–5.2)
SODIUM SERPL-SCNC: 135 MMOL/L (ref 136–145)
TRIGL SERPL-MCNC: 110 MG/DL (ref 0–150)
TSH SERPL DL<=0.005 MIU/L-ACNC: 3.07 UIU/ML (ref 0.27–4.2)
VLDLC SERPL CALC-MCNC: 22 MG/DL
WBC # BLD AUTO: 6.2 K/UL (ref 4–11)

## 2024-01-22 PROCEDURE — G0439 PPPS, SUBSEQ VISIT: HCPCS | Performed by: NURSE PRACTITIONER

## 2024-01-22 PROCEDURE — 1124F ACP DISCUSS-NO DSCNMKR DOCD: CPT | Performed by: NURSE PRACTITIONER

## 2024-01-22 PROCEDURE — 3079F DIAST BP 80-89 MM HG: CPT | Performed by: NURSE PRACTITIONER

## 2024-01-22 PROCEDURE — 3074F SYST BP LT 130 MM HG: CPT | Performed by: NURSE PRACTITIONER

## 2024-01-22 PROCEDURE — 99213 OFFICE O/P EST LOW 20 MIN: CPT | Performed by: NURSE PRACTITIONER

## 2024-01-22 RX ORDER — DULOXETIN HYDROCHLORIDE 60 MG/1
60 CAPSULE, DELAYED RELEASE ORAL DAILY
Qty: 30 CAPSULE | Refills: 1 | Status: SHIPPED | OUTPATIENT
Start: 2024-01-22

## 2024-01-22 ASSESSMENT — LIFESTYLE VARIABLES
HOW OFTEN DO YOU HAVE A DRINK CONTAINING ALCOHOL: 2-4 TIMES A MONTH
HOW MANY STANDARD DRINKS CONTAINING ALCOHOL DO YOU HAVE ON A TYPICAL DAY: 1 OR 2

## 2024-01-22 ASSESSMENT — PATIENT HEALTH QUESTIONNAIRE - PHQ9
SUM OF ALL RESPONSES TO PHQ QUESTIONS 1-9: 0
SUM OF ALL RESPONSES TO PHQ9 QUESTIONS 1 & 2: 0
SUM OF ALL RESPONSES TO PHQ QUESTIONS 1-9: 0
SUM OF ALL RESPONSES TO PHQ QUESTIONS 1-9: 0
1. LITTLE INTEREST OR PLEASURE IN DOING THINGS: 0
2. FEELING DOWN, DEPRESSED OR HOPELESS: 0
SUM OF ALL RESPONSES TO PHQ QUESTIONS 1-9: 0

## 2024-01-22 NOTE — PROGRESS NOTES
months ago and scheduled again for this week. Has completed PT without improvement in symptoms.   Continues with tightness and burning sensation.     Nov 2023 woke up with loss of sense of taste and smell. Seen ENT Dr. Erwin and had MRI completed. Was not able to find cause.     Patient's complete Health Risk Assessment and screening values have been reviewed and are found in Flowsheets. The following problems were reviewed today and where indicated follow up appointments were made and/or referrals ordered.    Positive Risk Factor Screenings with Interventions:    Fall Risk:  Do you feel unsteady or are you worried about falling? : (!) yes (Recently)  2 or more falls in past year?: no  Fall with injury in past year?: no       Interventions:    Reviewed medications, home hazards, visual acuity, and co-morbidities that can increase risk for falls             Activity, Diet, and Weight:  On average, how many days per week do you engage in moderate to strenuous exercise (like a brisk walk)?: 0 days  On average, how many minutes do you engage in exercise at this level?: 0 min    Do you eat balanced/healthy meals regularly?: Yes    Body mass index is 29.7 kg/m².        Inactivity Interventions:  Recommendations: patient agrees to exercise for at least 150 minutes/week                    Objective   Vitals:    01/22/24 1301   BP: 124/84   Site: Right Upper Arm   Position: Sitting   Cuff Size: Medium Adult   Pulse: 65   Resp: 18   Temp: 98.4 °F (36.9 °C)   TempSrc: Oral   SpO2: 99%   Weight: 76 kg (167 lb 9.6 oz)   Height: 1.6 m (5' 2.99\")      Body mass index is 29.7 kg/m².      Physical Exam  Vitals and nursing note reviewed.   Constitutional:       General: She is not in acute distress.     Appearance: She is well-developed.   HENT:      Head: Normocephalic and atraumatic.   Cardiovascular:      Rate and Rhythm: Normal rate and regular rhythm.      Heart sounds: Normal heart sounds. No murmur heard.     No friction

## 2024-01-22 NOTE — PATIENT INSTRUCTIONS
while other may be subject to a deductible, co-insurance, and/or copay.    Some of these benefits include a comprehensive review of your medical history including lifestyle, illnesses that may run in your family, and various assessments and screenings as appropriate.    After reviewing your medical record and screening and assessments performed today your provider may have ordered immunizations, labs, imaging, and/or referrals for you.  A list of these orders (if applicable) as well as your Preventive Care list are included within your After Visit Summary for your review.    Other Preventive Recommendations:    A preventive eye exam performed by an eye specialist is recommended every 1-2 years to screen for glaucoma; cataracts, macular degeneration, and other eye disorders.  A preventive dental visit is recommended every 6 months.  Try to get at least 150 minutes of exercise per week or 10,000 steps per day on a pedometer .  Order or download the FREE \"Exercise & Physical Activity: Your Everyday Guide\" from [employer]. Call 1-857.507.9596 or search [employer] online.  You need 6364-6144 mg of calcium and 9544-6915 IU of vitamin D per day. It is possible to meet your calcium requirement with diet alone, but a vitamin D supplement is usually necessary to meet this goal.  When exposed to the sun, use a sunscreen that protects against both UVA and UVB radiation with an SPF of 30 or greater. Reapply every 2 to 3 hours or after sweating, drying off with a towel, or swimming.  Always wear a seat belt when traveling in a car. Always wear a helmet when riding a bicycle or motorcycle.

## 2024-01-23 DIAGNOSIS — E83.42 HYPOMAGNESEMIA: ICD-10-CM

## 2024-01-23 RX ORDER — CALCIUM CARBONATE 300MG(750)
1 TABLET,CHEWABLE ORAL DAILY
Qty: 30 TABLET | Refills: 2 | Status: SHIPPED | OUTPATIENT
Start: 2024-01-23

## 2024-02-22 ENCOUNTER — OFFICE VISIT (OUTPATIENT)
Dept: FAMILY MEDICINE CLINIC | Age: 69
End: 2024-02-22
Payer: MEDICARE

## 2024-02-22 VITALS
HEART RATE: 60 BPM | TEMPERATURE: 98.3 F | HEIGHT: 63 IN | DIASTOLIC BLOOD PRESSURE: 84 MMHG | OXYGEN SATURATION: 100 % | WEIGHT: 166.6 LBS | RESPIRATION RATE: 18 BRPM | BODY MASS INDEX: 29.52 KG/M2 | SYSTOLIC BLOOD PRESSURE: 132 MMHG

## 2024-02-22 DIAGNOSIS — M48.061 SPINAL STENOSIS, LUMBAR REGION WITHOUT NEUROGENIC CLAUDICATION: Primary | ICD-10-CM

## 2024-02-22 DIAGNOSIS — E83.42 HYPOMAGNESEMIA: ICD-10-CM

## 2024-02-22 DIAGNOSIS — R25.2 MUSCLE CRAMPS: ICD-10-CM

## 2024-02-22 DIAGNOSIS — I10 ESSENTIAL HYPERTENSION: ICD-10-CM

## 2024-02-22 LAB — MAGNESIUM SERPL-MCNC: 1.9 MG/DL (ref 1.8–2.4)

## 2024-02-22 PROCEDURE — 3079F DIAST BP 80-89 MM HG: CPT | Performed by: NURSE PRACTITIONER

## 2024-02-22 PROCEDURE — 1124F ACP DISCUSS-NO DSCNMKR DOCD: CPT | Performed by: NURSE PRACTITIONER

## 2024-02-22 PROCEDURE — 99214 OFFICE O/P EST MOD 30 MIN: CPT | Performed by: NURSE PRACTITIONER

## 2024-02-22 PROCEDURE — 3075F SYST BP GE 130 - 139MM HG: CPT | Performed by: NURSE PRACTITIONER

## 2024-02-22 ASSESSMENT — ENCOUNTER SYMPTOMS: BACK PAIN: 1

## 2024-02-22 NOTE — PROGRESS NOTES
2/22/2024    This is a 68 y.o. female   Chief Complaint   Patient presents with    Hypertension     Has not been checking bp    Depression     Increase in meds has helped.   .    HPI    Hypertension:  Home blood pressure monitoring: Yes - 120/60s.  She is adherent to a low sodium diet. Patient denies chest pain, shortness of breath, headache, peripheral edema, palpitations, and dry cough.  Antihypertensive medication side effects: no medication side effects noted.    On losartan 12.5 mg daily                                       Sodium (mmol/L)   Date Value   01/22/2024 135 (L)    BUN (mg/dL)   Date Value   01/22/2024 10    Glucose (mg/dL)   Date Value   01/22/2024 97      Potassium (mmol/L)   Date Value   01/22/2024 4.5    Creatinine (mg/dL)   Date Value   01/22/2024 0.8         Spinal stenosis in cervical and lumbar- follows with Dr. Greenwood. Last epidural injection was a couple of weeks ago. Has completed PT without improvement in symptoms.   Continues with tightness and burning sensation.     Last month increased cymbalta to 60 mg daily to help with increased spine pain. Taking medication in the evening and that is helping her sleep better. Reports that all over pain has improved. Continues with pain in low back and hip area but has improved.    Next injection is Mid March.     Taking magnesium 400 mg daily due to low magnesium. Did not cause recurrent diarrhea. Reports that cramping is better.      Patient Active Problem List   Diagnosis    Pelvic pain in female    Vaginal dryness    Menopause    DDD (degenerative disc disease), lumbar    Thoracic spine pain    Left breast mass    Essential hypertension    Anxiety    Familial multiple lipoprotein-type hyperlipidemia    Spinal stenosis, cervical region    Lumbar radiculopathy    Cervical radiculopathy    Spinal stenosis, lumbar region without neurogenic claudication    Gastroesophageal reflux disease without esophagitis    S/P colonoscopy- 9/2021 recall 10

## 2024-03-13 ENCOUNTER — HOSPITAL ENCOUNTER (OUTPATIENT)
Dept: WOMENS IMAGING | Age: 69
Discharge: HOME | End: 2024-03-13
Payer: MEDICARE

## 2024-03-13 DIAGNOSIS — Z13.820 OSTEOPOROSIS SCREENING: ICD-10-CM

## 2024-03-13 DIAGNOSIS — Z78.0 POST-MENOPAUSAL: ICD-10-CM

## 2024-03-13 PROCEDURE — 77080 DXA BONE DENSITY AXIAL: CPT

## 2024-03-18 PROBLEM — M85.852 OSTEOPENIA OF NECK OF LEFT FEMUR: Status: ACTIVE | Noted: 2024-03-18

## 2024-03-20 DIAGNOSIS — M48.061 SPINAL STENOSIS, LUMBAR REGION WITHOUT NEUROGENIC CLAUDICATION: ICD-10-CM

## 2024-03-20 DIAGNOSIS — F41.9 ANXIETY: ICD-10-CM

## 2024-03-20 RX ORDER — DULOXETIN HYDROCHLORIDE 60 MG/1
60 CAPSULE, DELAYED RELEASE ORAL DAILY
Qty: 30 CAPSULE | Refills: 5 | Status: SHIPPED | OUTPATIENT
Start: 2024-03-20

## 2024-03-21 DIAGNOSIS — Z78.0 MENOPAUSE: ICD-10-CM

## 2024-03-21 RX ORDER — ESTRADIOL 1 MG/1
1 TABLET ORAL DAILY
Qty: 90 TABLET | Refills: 3 | Status: SHIPPED | OUTPATIENT
Start: 2024-03-21

## 2024-04-01 RX ORDER — ROSUVASTATIN CALCIUM 5 MG/1
5 TABLET, COATED ORAL DAILY
Qty: 100 TABLET | Refills: 1 | Status: SHIPPED | OUTPATIENT
Start: 2024-04-01

## 2024-05-15 DIAGNOSIS — I10 ESSENTIAL HYPERTENSION: ICD-10-CM

## 2024-05-15 RX ORDER — LOSARTAN POTASSIUM 25 MG/1
12.5 TABLET ORAL DAILY
Qty: 45 TABLET | Refills: 1 | Status: SHIPPED | OUTPATIENT
Start: 2024-05-15

## 2024-05-15 NOTE — TELEPHONE ENCOUNTER
I have that she is taking losartan 12.5 mg daily (1/2 25 mg tab). Is she now taking a full tab instead of half a tab?

## 2024-08-23 SDOH — ECONOMIC STABILITY: TRANSPORTATION INSECURITY
IN THE PAST 12 MONTHS, HAS LACK OF TRANSPORTATION KEPT YOU FROM MEETINGS, WORK, OR FROM GETTING THINGS NEEDED FOR DAILY LIVING?: NO

## 2024-08-23 SDOH — ECONOMIC STABILITY: FOOD INSECURITY: WITHIN THE PAST 12 MONTHS, THE FOOD YOU BOUGHT JUST DIDN'T LAST AND YOU DIDN'T HAVE MONEY TO GET MORE.: NEVER TRUE

## 2024-08-23 SDOH — ECONOMIC STABILITY: INCOME INSECURITY: HOW HARD IS IT FOR YOU TO PAY FOR THE VERY BASICS LIKE FOOD, HOUSING, MEDICAL CARE, AND HEATING?: NOT HARD AT ALL

## 2024-08-23 SDOH — ECONOMIC STABILITY: FOOD INSECURITY: WITHIN THE PAST 12 MONTHS, YOU WORRIED THAT YOUR FOOD WOULD RUN OUT BEFORE YOU GOT MONEY TO BUY MORE.: NEVER TRUE

## 2024-08-26 ENCOUNTER — OFFICE VISIT (OUTPATIENT)
Dept: FAMILY MEDICINE CLINIC | Age: 69
End: 2024-08-26
Payer: MEDICARE

## 2024-08-26 VITALS
OXYGEN SATURATION: 97 % | HEIGHT: 63 IN | RESPIRATION RATE: 18 BRPM | DIASTOLIC BLOOD PRESSURE: 82 MMHG | HEART RATE: 80 BPM | SYSTOLIC BLOOD PRESSURE: 124 MMHG | TEMPERATURE: 98.5 F | BODY MASS INDEX: 29.52 KG/M2 | WEIGHT: 166.6 LBS

## 2024-08-26 DIAGNOSIS — I10 ESSENTIAL HYPERTENSION: Primary | ICD-10-CM

## 2024-08-26 DIAGNOSIS — E83.42 HYPOMAGNESEMIA: ICD-10-CM

## 2024-08-26 DIAGNOSIS — I10 ESSENTIAL HYPERTENSION: ICD-10-CM

## 2024-08-26 DIAGNOSIS — F41.9 ANXIETY: ICD-10-CM

## 2024-08-26 DIAGNOSIS — E78.49 FAMILIAL MULTIPLE LIPOPROTEIN-TYPE HYPERLIPIDEMIA: ICD-10-CM

## 2024-08-26 DIAGNOSIS — K21.9 GASTROESOPHAGEAL REFLUX DISEASE WITHOUT ESOPHAGITIS: ICD-10-CM

## 2024-08-26 DIAGNOSIS — M48.061 SPINAL STENOSIS, LUMBAR REGION WITHOUT NEUROGENIC CLAUDICATION: ICD-10-CM

## 2024-08-26 LAB
ALBUMIN SERPL-MCNC: 4.4 G/DL (ref 3.4–5)
ALBUMIN/GLOB SERPL: 2.1 {RATIO} (ref 1.1–2.2)
ALP SERPL-CCNC: 77 U/L (ref 40–129)
ALT SERPL-CCNC: 14 U/L (ref 10–40)
ANION GAP SERPL CALCULATED.3IONS-SCNC: 9 MMOL/L (ref 3–16)
AST SERPL-CCNC: 19 U/L (ref 15–37)
BILIRUB SERPL-MCNC: 0.3 MG/DL (ref 0–1)
BUN SERPL-MCNC: 13 MG/DL (ref 7–20)
CALCIUM SERPL-MCNC: 9.9 MG/DL (ref 8.3–10.6)
CHLORIDE SERPL-SCNC: 100 MMOL/L (ref 99–110)
CO2 SERPL-SCNC: 26 MMOL/L (ref 21–32)
CREAT SERPL-MCNC: 0.8 MG/DL (ref 0.6–1.2)
GFR SERPLBLD CREATININE-BSD FMLA CKD-EPI: 79 ML/MIN/{1.73_M2}
GLUCOSE SERPL-MCNC: 97 MG/DL (ref 70–99)
MAGNESIUM SERPL-MCNC: 1.7 MG/DL (ref 1.8–2.4)
POTASSIUM SERPL-SCNC: 4.5 MMOL/L (ref 3.5–5.1)
PROT SERPL-MCNC: 6.5 G/DL (ref 6.4–8.2)
SODIUM SERPL-SCNC: 135 MMOL/L (ref 136–145)

## 2024-08-26 PROCEDURE — 3079F DIAST BP 80-89 MM HG: CPT | Performed by: NURSE PRACTITIONER

## 2024-08-26 PROCEDURE — 3074F SYST BP LT 130 MM HG: CPT | Performed by: NURSE PRACTITIONER

## 2024-08-26 PROCEDURE — 1124F ACP DISCUSS-NO DSCNMKR DOCD: CPT | Performed by: NURSE PRACTITIONER

## 2024-08-26 PROCEDURE — G2211 COMPLEX E/M VISIT ADD ON: HCPCS | Performed by: NURSE PRACTITIONER

## 2024-08-26 PROCEDURE — 99214 OFFICE O/P EST MOD 30 MIN: CPT | Performed by: NURSE PRACTITIONER

## 2024-08-26 ASSESSMENT — ENCOUNTER SYMPTOMS
SHORTNESS OF BREATH: 0
ABDOMINAL PAIN: 0
COUGH: 0
VOMITING: 0
CONSTIPATION: 0
DIARRHEA: 0
BACK PAIN: 1

## 2024-08-26 NOTE — PROGRESS NOTES
8/26/2024    This is a 69 y.o. female   Chief Complaint   Patient presents with    Hypertension     Has not been checking bp   .    HPI    Hypertension:  Home blood pressure monitoring: Yes - 120/60s.  She is adherent to a low sodium diet. Patient denies chest pain, shortness of breath, headache, lightheadedness, peripheral edema, palpitations, and dry cough.  Antihypertensive medication side effects: no medication side effects noted.      On losartan 12.5 mg daily. Reports that when she was taking the losartan 25 mg daily it caused low BP and that is why she dropped down to the 12.5 mg dose. She has not had low BP with the 12.5 mg dose.     Previous was on chlorthalidone and had to take potassium supplement with medication. Then tried lisinopril, but that caused severe diarrhea so had to stop medication.     Hyperlipidemia:  No new myalgias or GI upset on rosuvastatin (Crestor).       No results found for: \"LABA1C\"  Lab Results   Component Value Date    CREATININE 0.8 01/22/2024     Lab Results   Component Value Date    ALT 15 01/22/2024    AST 18 01/22/2024     Lab Results   Component Value Date    CHOL 169 01/22/2024    TRIG 110 01/22/2024    HDL 66 (H) 01/22/2024        GERD- controlled on omeprazole 40 mg daily. Has not been able to come off medication in past due to recurrent GERD.      Anxiety- on cymbalta 60 mg daily   Feels that anxiety is controlled on medication.   Son passed away July 2022.   Denies depression.      GYN- Dr. Ross   On estradiol 1 mg daily for hot flashes      Spinal stenosis in cervical and lumbar- follows with Dr. Greenwood and on meloxicam 15 mg daily PRN. Has not needed recently.   Gets epidural injections every couple of months. Last May 2024.     Patient Active Problem List   Diagnosis    Pelvic pain in female    Vaginal dryness    Menopause    DDD (degenerative disc disease), lumbar    Thoracic spine pain    Left breast mass    Essential hypertension    Anxiety    Familial multiple

## 2024-09-09 DIAGNOSIS — F41.9 ANXIETY: ICD-10-CM

## 2024-09-09 DIAGNOSIS — M48.061 SPINAL STENOSIS, LUMBAR REGION WITHOUT NEUROGENIC CLAUDICATION: ICD-10-CM

## 2024-09-10 RX ORDER — DULOXETIN HYDROCHLORIDE 60 MG/1
60 CAPSULE, DELAYED RELEASE ORAL DAILY
Qty: 30 CAPSULE | Refills: 5 | Status: SHIPPED | OUTPATIENT
Start: 2024-09-10

## 2024-09-25 RX ORDER — ROSUVASTATIN CALCIUM 5 MG/1
5 TABLET, COATED ORAL DAILY
Qty: 100 TABLET | Refills: 1 | Status: SHIPPED | OUTPATIENT
Start: 2024-09-25

## 2024-11-11 DIAGNOSIS — I10 ESSENTIAL HYPERTENSION: ICD-10-CM

## 2024-11-11 RX ORDER — LOSARTAN POTASSIUM 25 MG/1
12.5 TABLET ORAL DAILY
Qty: 45 TABLET | Refills: 1 | Status: SHIPPED | OUTPATIENT
Start: 2024-11-11

## 2024-11-24 ENCOUNTER — OFFICE VISIT (OUTPATIENT)
Age: 69
End: 2024-11-24

## 2024-11-24 VITALS
OXYGEN SATURATION: 93 % | HEART RATE: 86 BPM | WEIGHT: 166 LBS | BODY MASS INDEX: 29.41 KG/M2 | DIASTOLIC BLOOD PRESSURE: 76 MMHG | TEMPERATURE: 98.5 F | SYSTOLIC BLOOD PRESSURE: 118 MMHG

## 2024-11-24 DIAGNOSIS — J40 BRONCHITIS: ICD-10-CM

## 2024-11-24 DIAGNOSIS — J01.90 ACUTE NON-RECURRENT SINUSITIS, UNSPECIFIED LOCATION: Primary | ICD-10-CM

## 2024-11-24 RX ORDER — METHYLPREDNISOLONE 4 MG/1
TABLET ORAL
Qty: 1 KIT | Refills: 0 | Status: SHIPPED | OUTPATIENT
Start: 2024-11-24 | End: 2024-11-30

## 2024-11-24 RX ORDER — BROMPHENIRAMINE MALEATE, PSEUDOEPHEDRINE HYDROCHLORIDE, AND DEXTROMETHORPHAN HYDROBROMIDE 2; 30; 10 MG/5ML; MG/5ML; MG/5ML
5 SYRUP ORAL 4 TIMES DAILY PRN
Qty: 118 ML | Refills: 0 | Status: SHIPPED | OUTPATIENT
Start: 2024-11-24

## 2024-11-24 RX ORDER — AZITHROMYCIN 250 MG/1
TABLET, FILM COATED ORAL
Qty: 6 TABLET | Refills: 0 | Status: SHIPPED | OUTPATIENT
Start: 2024-11-24 | End: 2024-12-04

## 2024-11-24 RX ORDER — GUAIFENESIN 600 MG/1
600 TABLET, EXTENDED RELEASE ORAL 2 TIMES DAILY
Qty: 30 TABLET | Refills: 0 | Status: SHIPPED | OUTPATIENT
Start: 2024-11-24 | End: 2024-12-09

## 2024-12-02 ENCOUNTER — HOSPITAL ENCOUNTER (OUTPATIENT)
Dept: WOMENS IMAGING | Age: 69
Discharge: HOME | End: 2024-12-02
Payer: MEDICARE

## 2024-12-02 VITALS — HEIGHT: 63 IN | BODY MASS INDEX: 29.23 KG/M2 | WEIGHT: 165 LBS

## 2024-12-02 DIAGNOSIS — Z12.31 BREAST CANCER SCREENING BY MAMMOGRAM: ICD-10-CM

## 2024-12-02 PROCEDURE — 77063 BREAST TOMOSYNTHESIS BI: CPT

## 2024-12-04 ENCOUNTER — TELEPHONE (OUTPATIENT)
Dept: PHARMACY | Facility: CLINIC | Age: 69
End: 2024-12-04

## 2024-12-04 NOTE — TELEPHONE ENCOUNTER
[facility] CLINICAL PHARMACY: ADHERENCE REVIEW  Identified care gap per [employer]: fills at [facility]: Statin adherence    Patient also appears to be prescribed: ACE/ARB    ASSESSMENT  STATIN ADHERENCE    Insurance Records claims through 24 (Prior Year PDC = 80% - PASSED ; YTD PDC = 84%; Potential Fail Date: 24):   ROSUVASTATIN TAB 5MG last filled on 24 for 100 day supply. Next refill due: 10.19.24    Prescribed si tablet/capsule daily    Per Insurer Portal: last filled on same    Per [facility]: last picked up on 24 for 100 day supply.    Lab Results   Component Value Date    CHOL 169 2024    TRIG 110 2024    HDL 66 (H) 2024     Lab Results   Component Value Date    LDL 81 2024      ALT   Date Value Ref Range Status   2024 14 10 - 40 U/L Final     AST   Date Value Ref Range Status   2024 19 15 - 37 U/L Final     The 10-year ASCVD risk score (Pooja ELLIS, et al., 2019) is: 9.9%    Values used to calculate the score:      Age: 69 years      Sex: Female      Is Non- : No      Diabetic: No      Tobacco smoker: No      Systolic Blood Pressure: 118 mmHg      Is BP treated: Yes      HDL Cholesterol: 66 mg/dL      Total Cholesterol: 169 mg/dL         The following are interventions that have been identified:   Patient OVERDUE refilling Rosuvastatin and active on home medication list.     No patient outreach planned at this time.    Per [facility] patient did p/u 24    Last Visit: 24  Next Visit: 25        Charlette Mcknight CPhT  Population Health Clinical   [facility]  Toll Free: [phone] Option 1    For Pharmacy Admin Tracking Only    Program: [employer]  CPA in place:  No  Gap Closed?: Yes   Time Spent (min): 10

## 2024-12-20 ENCOUNTER — TELEPHONE (OUTPATIENT)
Dept: GYNECOLOGY | Age: 69
End: 2024-12-20

## 2024-12-20 DIAGNOSIS — Z78.0 MENOPAUSE: ICD-10-CM

## 2024-12-20 RX ORDER — ESTRADIOL 1 MG/1
1 TABLET ORAL DAILY
Qty: 90 TABLET | Refills: 3 | Status: SHIPPED | OUTPATIENT
Start: 2024-12-20 | End: 2024-12-21 | Stop reason: SDUPTHER

## 2024-12-20 NOTE — TELEPHONE ENCOUNTER
Patient says she lost her script leaving the pharmacy yesterday and requesting another refill estradiol        [id number] - [place], [place] - [address] - P [phone] - F [phone] [[id number]]

## 2024-12-20 NOTE — TELEPHONE ENCOUNTER
Medication:   Requested Prescriptions     Pending Prescriptions Disp Refills    estradiol (ESTRACE) 1 MG tablet [Pharmacy Med Name: ESTRADIOL 1 MG TABLET] 90 tablet 3     Sig: TAKE 1 TABLET BY MOUTH DAILY        Last Filled:  3/21/24    Last appt: 1/9/2023   Next appt: Visit date not found    Last OARRS:        No data to display

## 2024-12-21 DIAGNOSIS — Z78.0 MENOPAUSE: ICD-10-CM

## 2024-12-21 RX ORDER — ESTRADIOL 1 MG/1
1 TABLET ORAL DAILY
Qty: 90 TABLET | Refills: 3 | Status: SHIPPED | OUTPATIENT
Start: 2024-12-21

## 2025-02-27 ENCOUNTER — OFFICE VISIT (OUTPATIENT)
Dept: FAMILY MEDICINE CLINIC | Age: 70
End: 2025-02-27

## 2025-02-27 VITALS
HEART RATE: 80 BPM | SYSTOLIC BLOOD PRESSURE: 124 MMHG | HEIGHT: 63 IN | WEIGHT: 157 LBS | TEMPERATURE: 98.7 F | DIASTOLIC BLOOD PRESSURE: 68 MMHG | OXYGEN SATURATION: 96 % | BODY MASS INDEX: 27.82 KG/M2 | RESPIRATION RATE: 18 BRPM

## 2025-02-27 DIAGNOSIS — F41.9 ANXIETY: ICD-10-CM

## 2025-02-27 DIAGNOSIS — I10 ESSENTIAL HYPERTENSION: ICD-10-CM

## 2025-02-27 DIAGNOSIS — M48.061 SPINAL STENOSIS, LUMBAR REGION WITHOUT NEUROGENIC CLAUDICATION: ICD-10-CM

## 2025-02-27 DIAGNOSIS — L57.0 ACTINIC KERATOSIS: ICD-10-CM

## 2025-02-27 DIAGNOSIS — Z00.00 MEDICARE ANNUAL WELLNESS VISIT, SUBSEQUENT: Primary | ICD-10-CM

## 2025-02-27 DIAGNOSIS — E83.42 HYPOMAGNESEMIA: ICD-10-CM

## 2025-02-27 DIAGNOSIS — E78.49 FAMILIAL MULTIPLE LIPOPROTEIN-TYPE HYPERLIPIDEMIA: ICD-10-CM

## 2025-02-27 DIAGNOSIS — K21.9 GASTROESOPHAGEAL REFLUX DISEASE WITHOUT ESOPHAGITIS: ICD-10-CM

## 2025-02-27 SDOH — ECONOMIC STABILITY: FOOD INSECURITY: WITHIN THE PAST 12 MONTHS, THE FOOD YOU BOUGHT JUST DIDN'T LAST AND YOU DIDN'T HAVE MONEY TO GET MORE.: NEVER TRUE

## 2025-02-27 SDOH — ECONOMIC STABILITY: FOOD INSECURITY: WITHIN THE PAST 12 MONTHS, YOU WORRIED THAT YOUR FOOD WOULD RUN OUT BEFORE YOU GOT MONEY TO BUY MORE.: NEVER TRUE

## 2025-02-27 ASSESSMENT — PATIENT HEALTH QUESTIONNAIRE - PHQ9
SUM OF ALL RESPONSES TO PHQ QUESTIONS 1-9: 0
2. FEELING DOWN, DEPRESSED OR HOPELESS: NOT AT ALL
SUM OF ALL RESPONSES TO PHQ QUESTIONS 1-9: 0
1. LITTLE INTEREST OR PLEASURE IN DOING THINGS: NOT AT ALL
SUM OF ALL RESPONSES TO PHQ9 QUESTIONS 1 & 2: 0

## 2025-02-27 ASSESSMENT — LIFESTYLE VARIABLES
HOW MANY STANDARD DRINKS CONTAINING ALCOHOL DO YOU HAVE ON A TYPICAL DAY: 1 OR 2
HOW OFTEN DO YOU HAVE A DRINK CONTAINING ALCOHOL: 2-4 TIMES A MONTH

## 2025-02-27 NOTE — PATIENT INSTRUCTIONS
Learning About Being Active as an Older Adult  Why is being active important as you get older?     Being active is one of the best things you can do for your health. And it's never too late to start. Being active--or getting active, if you aren't already--has definite benefits. It can:  Give you more energy,  Keep your mind sharp.  Improve balance to reduce your risk of falls.  Help you manage chronic illness with fewer medicines.  No matter how old you are, how fit you are, or what health problems you have, there is a form of activity that will work for you. And the more physical activity you can do, the better your overall health will be.  What kinds of activity can help you stay healthy?  Being more active will make your daily activities easier. Physical activity includes planned exercise and things you do in daily life. There are four types of activity:  Aerobic.  Doing aerobic activity makes your heart and lungs strong.  Includes walking, dancing, and gardening.  Aim for at least 2½ hours spread throughout the week.  It improves your energy and can help you sleep better.  Muscle-strengthening.  This type of activity can help maintain muscle and strengthen bones.  Includes climbing stairs, using resistance bands, and lifting or carrying heavy loads.  Aim for at least twice a week.  It can help protect the knees and other joints.  Stretching.  Stretching gives you better range of motion in joints and muscles.  Includes upper arm stretches, calf stretches, and gentle yoga.  Aim for at least twice a week, preferably after your muscles are warmed up from other activities.  It can help you function better in daily life.  Balancing.  This helps you stay coordinated and have good posture.  Includes heel-to-toe walking, snehal chi, and certain types of yoga.  Aim for at least 3 days a week.  It can reduce your risk of falling.  Even if you have a hard time meeting the recommendations, it's better to be more active

## 2025-02-27 NOTE — PROGRESS NOTES
Allergies   Allergen Reactions    Latex Hives, Itching and Rash     \"around the site\"      Codeine Hives    Ezetimibe Other (See Comments)     Muscle pain     Pravastatin Itching    Tetanus Toxoids Swelling     Swelling at injection site     Prior to Visit Medications    Medication Sig Taking? Authorizing Provider   estradiol (ESTRACE) 1 MG tablet Take 1 tablet by mouth daily Yes Lita Ross MD   losartan (COZAAR) 25 MG tablet TAKE 1/2 TABLET BY MOUTH DAILY Yes Lisandra Dash APRN - CNP   rosuvastatin (CRESTOR) 5 MG tablet TAKE 1 TABLET BY MOUTH DAILY Yes Lisandra Dash APRN - CNP   DULoxetine (CYMBALTA) 60 MG extended release capsule TAKE 1 CAPSULE BY MOUTH DAILY Yes Lisandra Dash APRN - CNP   Magnesium 400 MG TABS Take 1 tablet by mouth daily Yes Lisandra Dash APRN - CNP   Cholecalciferol (VITAMIN D3) 50 MCG (2000 UT) CAPS Take by mouth daily Yes Xi Davis MD   Fluticasone Propionate (FLONASE NA) by Nasal route as needed Yes ProviderXi MD   loratadine (CLARITIN) 10 MG tablet Take 1 tablet by mouth daily as needed Yes ProviderXi MD   omeprazole (PRILOSEC) 40 MG capsule Take 1 capsule by mouth daily Yes ProviderXi MD       CareTeam (Including outside providers/suppliers regularly involved in providing care):   Patient Care Team:  Lisandra Dash APRN - CNP as PCP - General (Family Nurse Practitioner)  Lisandra Dash APRN - CNP as PCP - Empaneled Provider  Jennifer Guy MD as Surgeon (General Surgery)  Audi Greenwood MD as Consulting Physician (Physical Medicine and Rehab)     Recommendations for Preventive Services Due: see orders and patient instructions/AVS.  Recommended screening schedule for the next 5-10 years is provided to the patient in written form: see Patient Instructions/AVS.     Reviewed and updated this visit:  Tobacco  Allergies  Meds  Problems  Med Hx  Surg Hx  Fam Hx  Sexual   Hx

## 2025-03-07 DIAGNOSIS — E78.49 FAMILIAL MULTIPLE LIPOPROTEIN-TYPE HYPERLIPIDEMIA: ICD-10-CM

## 2025-03-07 DIAGNOSIS — Z00.00 MEDICARE ANNUAL WELLNESS VISIT, SUBSEQUENT: ICD-10-CM

## 2025-03-07 DIAGNOSIS — I10 ESSENTIAL HYPERTENSION: ICD-10-CM

## 2025-03-07 DIAGNOSIS — E83.42 HYPOMAGNESEMIA: ICD-10-CM

## 2025-03-07 LAB
ALBUMIN SERPL-MCNC: 4.3 G/DL (ref 3.4–5)
ALBUMIN/GLOB SERPL: 2.2 {RATIO} (ref 1.1–2.2)
ALP SERPL-CCNC: 72 U/L (ref 40–129)
ALT SERPL-CCNC: 21 U/L (ref 10–40)
ANION GAP SERPL CALCULATED.3IONS-SCNC: 8 MMOL/L (ref 3–16)
AST SERPL-CCNC: 22 U/L (ref 15–37)
BASOPHILS # BLD: 0 K/UL (ref 0–0.2)
BASOPHILS NFR BLD: 0.6 %
BILIRUB SERPL-MCNC: 0.4 MG/DL (ref 0–1)
BUN SERPL-MCNC: 11 MG/DL (ref 7–20)
CALCIUM SERPL-MCNC: 10.3 MG/DL (ref 8.3–10.6)
CHLORIDE SERPL-SCNC: 100 MMOL/L (ref 99–110)
CHOLEST SERPL-MCNC: 182 MG/DL (ref 0–199)
CO2 SERPL-SCNC: 28 MMOL/L (ref 21–32)
CREAT SERPL-MCNC: 1 MG/DL (ref 0.6–1.2)
DEPRECATED RDW RBC AUTO: 13.5 % (ref 12.4–15.4)
EOSINOPHIL # BLD: 0.1 K/UL (ref 0–0.6)
EOSINOPHIL NFR BLD: 1.9 %
GFR SERPLBLD CREATININE-BSD FMLA CKD-EPI: 60 ML/MIN/{1.73_M2}
GLUCOSE SERPL-MCNC: 97 MG/DL (ref 70–99)
HCT VFR BLD AUTO: 43.4 % (ref 36–48)
HDLC SERPL-MCNC: 67 MG/DL (ref 40–60)
HGB BLD-MCNC: 14.6 G/DL (ref 12–16)
LDLC SERPL CALC-MCNC: 97 MG/DL
LYMPHOCYTES # BLD: 1.8 K/UL (ref 1–5.1)
LYMPHOCYTES NFR BLD: 33 %
MAGNESIUM SERPL-MCNC: 2.05 MG/DL (ref 1.8–2.4)
MCH RBC QN AUTO: 31.3 PG (ref 26–34)
MCHC RBC AUTO-ENTMCNC: 33.6 G/DL (ref 31–36)
MCV RBC AUTO: 93.1 FL (ref 80–100)
MONOCYTES # BLD: 0.4 K/UL (ref 0–1.3)
MONOCYTES NFR BLD: 7.7 %
NEUTROPHILS # BLD: 3.1 K/UL (ref 1.7–7.7)
NEUTROPHILS NFR BLD: 56.8 %
PLATELET # BLD AUTO: 328 K/UL (ref 135–450)
PMV BLD AUTO: 9.3 FL (ref 5–10.5)
POTASSIUM SERPL-SCNC: 5 MMOL/L (ref 3.5–5.1)
PROT SERPL-MCNC: 6.3 G/DL (ref 6.4–8.2)
RBC # BLD AUTO: 4.66 M/UL (ref 4–5.2)
SODIUM SERPL-SCNC: 136 MMOL/L (ref 136–145)
TRIGL SERPL-MCNC: 91 MG/DL (ref 0–150)
TSH SERPL DL<=0.005 MIU/L-ACNC: 3.03 UIU/ML (ref 0.27–4.2)
VLDLC SERPL CALC-MCNC: 18 MG/DL
WBC # BLD AUTO: 5.5 K/UL (ref 4–11)

## 2025-03-10 ENCOUNTER — RESULTS FOLLOW-UP (OUTPATIENT)
Dept: FAMILY MEDICINE CLINIC | Age: 70
End: 2025-03-10

## 2025-03-16 DIAGNOSIS — F41.9 ANXIETY: ICD-10-CM

## 2025-03-16 DIAGNOSIS — M48.061 SPINAL STENOSIS, LUMBAR REGION WITHOUT NEUROGENIC CLAUDICATION: ICD-10-CM

## 2025-03-17 RX ORDER — DULOXETIN HYDROCHLORIDE 60 MG/1
60 CAPSULE, DELAYED RELEASE ORAL DAILY
Qty: 30 CAPSULE | Refills: 5 | Status: SHIPPED | OUTPATIENT
Start: 2025-03-17

## 2025-04-28 ENCOUNTER — HOSPITAL ENCOUNTER (OUTPATIENT)
Age: 70
Discharge: HOME | End: 2025-04-28
Payer: MEDICARE

## 2025-04-28 ENCOUNTER — HOSPITAL ENCOUNTER (OUTPATIENT)
Dept: GENERAL RADIOLOGY | Age: 70
Discharge: HOME | End: 2025-04-28
Payer: MEDICARE

## 2025-04-28 PROCEDURE — 72200 X-RAY EXAM SI JOINTS: CPT

## 2025-05-13 DIAGNOSIS — I10 ESSENTIAL HYPERTENSION: ICD-10-CM

## 2025-05-14 RX ORDER — ROSUVASTATIN CALCIUM 5 MG/1
5 TABLET, COATED ORAL DAILY
Qty: 100 TABLET | Refills: 1 | Status: SHIPPED | OUTPATIENT
Start: 2025-05-14

## 2025-05-14 RX ORDER — LOSARTAN POTASSIUM 25 MG/1
TABLET ORAL
Qty: 45 TABLET | Refills: 1 | Status: SHIPPED | OUTPATIENT
Start: 2025-05-14

## 2025-08-07 ENCOUNTER — HOSPITAL ENCOUNTER (OUTPATIENT)
Dept: GENERAL RADIOLOGY | Age: 70
End: 2025-08-07
Payer: MEDICARE

## 2025-08-07 ENCOUNTER — HOSPITAL ENCOUNTER (OUTPATIENT)
Dept: GENERAL RADIOLOGY | Age: 70
Discharge: HOME | End: 2025-08-07
Payer: MEDICARE

## 2025-08-07 PROCEDURE — 72100 X-RAY EXAM L-S SPINE 2/3 VWS: CPT

## (undated) DEVICE — SPECIMEN ORIENTATION CHARMS, SIX DISTINCTLY SHAPED STERILE 10MM CHARMS: Brand: MARGINMAP

## (undated) DEVICE — PORT VLV 2 W NDL FREE SMRTSITE

## (undated) DEVICE — MEDIA CONTRAST RX ISOVUE-300 61% 30ML VIALS

## (undated) DEVICE — ATTACHMENT SMK EVAC FOR ES PNCL ACCUVAC

## (undated) DEVICE — SHEET,DRAPE,53X77,STERILE: Brand: MEDLINE

## (undated) DEVICE — GLOVE ORANGE PI 7   MSG9070

## (undated) DEVICE — CHLORAPREP 26ML ORANGE

## (undated) DEVICE — TOWEL,OR,DSP,ST,BLUE,STD,4/PK,20PK/CS: Brand: MEDLINE

## (undated) DEVICE — NEEDLE SPNL 22GA L7IN BLK HUB S STL W/ QNCKE PNT W/OUT

## (undated) DEVICE — STERILE POLYISOPRENE POWDER-FREE SURGICAL GLOVES: Brand: PROTEXIS

## (undated) DEVICE — PEN: MARKING STD 100/CS: Brand: MEDICAL ACTION INDUSTRIES

## (undated) DEVICE — UNIVERSAL BLOCK TRAY: Brand: AVANOS*

## (undated) DEVICE — STANDARD HYPODERMIC NEEDLE,POLYPROPYLENE HUB: Brand: MONOJECT

## (undated) DEVICE — Device: Brand: JELCO

## (undated) DEVICE — ADHESIVE SKIN CLOSURE TOP 36 CC HI VISC DERMBND MINI

## (undated) DEVICE — CATHETER URETH 14FR BLLN 5CC SIL HYDRGEL 2 W F LUBRICIOUS

## (undated) DEVICE — BLADE ES ELASTOMERIC COAT INSUL DURABLE BEND UPTO 90DEG

## (undated) DEVICE — GLOVE SURG SZ 65 CRM LTX FREE POLYISOPRENE POLYMER BEAD ANTI

## (undated) DEVICE — SOLUTION IRRIG 3000ML STRL H2O USP UROMATIC PLAS CONT

## (undated) DEVICE — CANISTER, RIGID, 1200CC: Brand: MEDLINE INDUSTRIES, INC.

## (undated) DEVICE — NEEDLE SPNL 22GA L3.5IN BLK HUB S STL REG WALL FIT STYL W/

## (undated) DEVICE — GAUZE,SPONGE,4"X4",8PLY,STRL,LF,10/TRAY: Brand: MEDLINE

## (undated) DEVICE — SOLUTION IV IRRIG POUR BRL 0.9% SODIUM CHL 2F7124

## (undated) DEVICE — DRAPE,MINOR PROC,6X6 FEN, STER: Brand: MEDLINE

## (undated) DEVICE — NEEDLE CYSTO FLX 23 GAX70 CM ADJ DEPTH PRECIS INJ INJETAK

## (undated) DEVICE — PROVE COVER: Brand: UNBRANDED

## (undated) DEVICE — HYPODERMIC SAFETY NEEDLE: Brand: MAGELLAN

## (undated) DEVICE — SYRINGE MED 10ML LUERLOCK TIP W/O SFTY DISP

## (undated) DEVICE — SYRINGE,EAR/ULCER, 2 OZ, STERILE: Brand: MEDLINE

## (undated) DEVICE — SYRINGE,CONTROL,LL,FINGER,GRIP: Brand: MEDLINE INDUSTRIES, INC.

## (undated) DEVICE — SYRINGE MED 3ML CLR PLAS STD N CTRL LUERLOCK TIP DISP

## (undated) DEVICE — DISPOSABLE OR TOWEL: Brand: CARDINAL HEALTH

## (undated) DEVICE — INTENDED FOR TISSUE SEPARATION, AND OTHER PROCEDURES THAT REQUIRE A SHARP SURGICAL BLADE TO PUNCTURE OR CUT.: Brand: BARD-PARKER ® STAINLESS STEEL BLADES

## (undated) DEVICE — SKIN MARKER REGULAR TIP WITH RULER CAP AND LABELS: Brand: DEVON

## (undated) DEVICE — CYSTOSCOPY: Brand: MEDLINE INDUSTRIES, INC.

## (undated) DEVICE — NEEDLE ENDOSCP 1ML SYR CA HA SIDEKCK RIG INJ COAPTITE

## (undated) DEVICE — MINOR SET UP: Brand: MEDLINE INDUSTRIES, INC.

## (undated) DEVICE — GOWN SIRUS NONREIN XL W/TWL: Brand: MEDLINE INDUSTRIES, INC.

## (undated) DEVICE — NEEDLE SPNL 22GA L2.5IN BLK QNCKE BVL DISP

## (undated) DEVICE — TUBING, SUCTION, 1/4" X 12', STRAIGHT: Brand: MEDLINE

## (undated) DEVICE — NEEDLE EPI L3.5IN OD20GA CLR POLYCARB HUB WNG N DEHP TUOHY

## (undated) DEVICE — GAUZE FLUFF 2 PLY: Brand: DEROYAL

## (undated) DEVICE — 3M™ TEGADERM™ +PAD FILM DRESSING WITH NON-ADHERENT PAD, 3588, 6 IN X 6 IN (15 CM X 15 CM), 25/CAR, 4 CAR/CS: Brand: 3M™ TEGADERM™

## (undated) DEVICE — NEEDLE SPNL L3.5IN PNK HUB S STL REG WALL FIT STYL W/ QNCKE

## (undated) DEVICE — NEEDLE EPI 22GA L2IN CLR HUB N WNG PERIFIX TUOHY

## (undated) DEVICE — BRA COMPR XL NYL LYCRA SPANDEX WHT CURAD

## (undated) DEVICE — SET EXTN L7IN PRIMING VOL 0.5ML PRSS RATE STD BOR 1 REM

## (undated) DEVICE — SUTURE VCRL + SZ 3-0 L27IN ABSRB UD L26MM SH 1/2 CIR VCP416H

## (undated) DEVICE — SOLUTION IV IRRIG WATER 1000ML POUR BRL 2F7114

## (undated) DEVICE — 6 ML SYRINGE LUER-LOCK TIP: Brand: MONOJECT

## (undated) DEVICE — NEEDLE HYPO 18GA L1.5IN THN WALL PIVOTING SHLD BVL ORIENTED

## (undated) DEVICE — SUTURE MCRYL + SZ 4-0 L18IN ABSRB UD L19MM PS-2 3/8 CIR MCP496G

## (undated) DEVICE — ELECTRODE PT RET AD L9FT HI MOIST COND ADH HYDRGEL CORDED